# Patient Record
Sex: FEMALE | Race: WHITE | Employment: UNEMPLOYED | URBAN - METROPOLITAN AREA
[De-identification: names, ages, dates, MRNs, and addresses within clinical notes are randomized per-mention and may not be internally consistent; named-entity substitution may affect disease eponyms.]

---

## 2017-03-11 ENCOUNTER — HOSPITAL ENCOUNTER (OUTPATIENT)
Dept: WOMENS IMAGING | Age: 52
Discharge: HOME | End: 2017-03-11
Payer: COMMERCIAL

## 2017-03-11 DIAGNOSIS — Z78.0 POST-MENOPAUSAL: ICD-10-CM

## 2017-03-11 DIAGNOSIS — Z13.9 SCREENING: ICD-10-CM

## 2017-03-11 PROCEDURE — 77080 DXA BONE DENSITY AXIAL: CPT

## 2017-03-11 PROCEDURE — G0202 SCR MAMMO BI INCL CAD: HCPCS

## 2017-03-13 ENCOUNTER — APPOINTMENT (OUTPATIENT)
Dept: GENERAL RADIOLOGY | Age: 52
End: 2017-03-13
Payer: OTHER MISCELLANEOUS

## 2017-03-13 ENCOUNTER — HOSPITAL ENCOUNTER (EMERGENCY)
Age: 52
Discharge: HOME | End: 2017-03-13
Attending: EMERGENCY MEDICINE
Payer: OTHER MISCELLANEOUS

## 2017-03-13 VITALS
RESPIRATION RATE: 16 BRPM | HEART RATE: 71 BPM | OXYGEN SATURATION: 99 % | TEMPERATURE: 99 F | DIASTOLIC BLOOD PRESSURE: 81 MMHG | SYSTOLIC BLOOD PRESSURE: 159 MMHG

## 2017-03-13 DIAGNOSIS — M25.512 ACUTE PAIN OF LEFT SHOULDER: ICD-10-CM

## 2017-03-13 DIAGNOSIS — M25.562 LATERAL KNEE PAIN, LEFT: ICD-10-CM
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
## 2017-03-13 DIAGNOSIS — M54.2 NECK PAIN: ICD-10-CM

## 2017-03-13 PROCEDURE — 73562 X-RAY EXAM OF KNEE 3: CPT

## 2017-03-13 PROCEDURE — 73030 X-RAY EXAM OF SHOULDER: CPT

## 2017-03-13 PROCEDURE — 6370000000 HC RX 637 (ALT 250 FOR IP): Performed by: EMERGENCY MEDICINE

## 2017-03-13 PROCEDURE — 99284 EMERGENCY DEPT VISIT MOD MDM: CPT

## 2017-03-13 PROCEDURE — 71020 XR CHEST STANDARD TWO VW: CPT

## 2017-03-13 RX ORDER — CYCLOBENZAPRINE HCL 10 MG
10 TABLET ORAL 3 TIMES DAILY PRN
Qty: 15 TABLET | Refills: 0 | Status: SHIPPED | OUTPATIENT
Start: 2017-03-13 | End: 2017-03-23

## 2017-03-13 RX ORDER — IBUPROFEN 800 MG/1
800 TABLET ORAL EVERY 8 HOURS PRN
Qty: 30 TABLET | Refills: 0 | Status: SHIPPED | OUTPATIENT
Start: 2017-03-13 | End: 2019-07-23

## 2017-03-13 RX ORDER — IBUPROFEN 800 MG/1
800 TABLET ORAL ONCE
Status: COMPLETED | OUTPATIENT
Start: 2017-03-13 | End: 2017-03-13

## 2017-03-13 RX ADMIN — IBUPROFEN 800 MG: 800 TABLET, FILM COATED ORAL at 20:49

## 2017-03-13 ASSESSMENT — ENCOUNTER SYMPTOMS
BACK PAIN: 0
ABDOMINAL PAIN: 0
SORE THROAT: 0
NAUSEA: 0
COUGH: 0
VOMITING: 0
SHORTNESS OF BREATH: 0

## 2017-03-13 ASSESSMENT — PAIN DESCRIPTION - PAIN TYPE: TYPE: ACUTE PAIN

## 2017-03-13 ASSESSMENT — PAIN SCALES - GENERAL: PAINLEVEL_OUTOF10: 3

## 2017-03-13 ASSESSMENT — PAIN DESCRIPTION - ORIENTATION: ORIENTATION: LEFT

## 2017-03-21 ENCOUNTER — HOSPITAL ENCOUNTER (OUTPATIENT)
Age: 52
Discharge: HOME | End: 2017-03-21
Payer: OTHER MISCELLANEOUS

## 2017-03-21 ENCOUNTER — HOSPITAL ENCOUNTER (OUTPATIENT)
Dept: GENERAL RADIOLOGY | Age: 52
Discharge: HOME | End: 2017-03-21
Payer: OTHER MISCELLANEOUS

## 2017-03-21 DIAGNOSIS — R52 PAIN: ICD-10-CM

## 2017-03-21 LAB — TSH SERPL DL<=0.05 MIU/L-ACNC: 0.73 MIU/L (ref 0.3–5)

## 2017-03-21 PROCEDURE — 84443 ASSAY THYROID STIM HORMONE: CPT

## 2017-03-21 PROCEDURE — 73630 X-RAY EXAM OF FOOT: CPT

## 2017-03-21 PROCEDURE — 36415 COLL VENOUS BLD VENIPUNCTURE: CPT

## 2017-03-21 PROCEDURE — 73650 X-RAY EXAM OF HEEL: CPT

## 2019-07-17 ENCOUNTER — TELEPHONE (OUTPATIENT)
Dept: OBGYN CLINIC | Age: 54
End: 2019-07-17

## 2019-07-17 ENCOUNTER — OFFICE VISIT (OUTPATIENT)
Dept: OBGYN CLINIC | Age: 54
End: 2019-07-17

## 2019-07-17 ENCOUNTER — HOSPITAL ENCOUNTER (OUTPATIENT)
Age: 54
Setting detail: SPECIMEN
Discharge: HOME | End: 2019-07-17

## 2019-07-17 VITALS
HEART RATE: 50 BPM | HEIGHT: 65 IN | BODY MASS INDEX: 32.82 KG/M2 | WEIGHT: 197 LBS | SYSTOLIC BLOOD PRESSURE: 123 MMHG | DIASTOLIC BLOOD PRESSURE: 67 MMHG

## 2019-07-17 DIAGNOSIS — D21.9 FIBROID: ICD-10-CM

## 2019-07-17 DIAGNOSIS — Z01.419 WOMEN'S ANNUAL ROUTINE GYNECOLOGICAL EXAMINATION: ICD-10-CM

## 2019-07-17 DIAGNOSIS — Z12.39 SCREENING FOR BREAST CANCER: ICD-10-CM

## 2019-07-17 DIAGNOSIS — R10.2 PELVIC PAIN: ICD-10-CM

## 2019-07-17 DIAGNOSIS — Z01.419 WOMEN'S ANNUAL ROUTINE GYNECOLOGICAL EXAMINATION: Primary | ICD-10-CM

## 2019-07-17 LAB
T4 TOTAL: 7.5 UG/DL (ref 4.5–12)
TSH SERPL DL<=0.05 MIU/L-ACNC: 2.66 MIU/L (ref 0.3–5)

## 2019-07-17 PROCEDURE — 99386 PREV VISIT NEW AGE 40-64: CPT | Performed by: ADVANCED PRACTICE MIDWIFE

## 2019-07-17 ASSESSMENT — ENCOUNTER SYMPTOMS
CONSTIPATION: 1
RECTAL PAIN: 1
RESPIRATORY NEGATIVE: 1
BACK PAIN: 1
ABDOMINAL PAIN: 1

## 2019-07-17 NOTE — PROGRESS NOTES
T4   2. Screening for breast cancer  MONSERRAT DIGITAL SCREEN W CAD BILATERAL   3. Pelvic pain     4.  Fibroid

## 2019-07-17 NOTE — TELEPHONE ENCOUNTER
Called Sheldon Danielle and stated we need her to call us and reschedule her appt and it needs to be with Dr Elizabeth Bacon there is a need for surgery not saying the patient needs surgery but if it is warranted Dr Daniel Lombardo would be the surgeon

## 2019-07-19 LAB
HPV SAMPLE: NORMAL
HPV, GENOTYPE 16: NOT DETECTED
HPV, GENOTYPE 18: NOT DETECTED
HPV, HIGH RISK OTHER: NOT DETECTED
HPV, INTERPRETATION: NORMAL
SPECIMEN DESCRIPTION: NORMAL

## 2019-07-22 LAB — CYTOLOGY REPORT: NORMAL

## 2019-07-23 ENCOUNTER — OFFICE VISIT (OUTPATIENT)
Dept: FAMILY MEDICINE CLINIC | Age: 54
End: 2019-07-23
Payer: COMMERCIAL

## 2019-07-23 VITALS
WEIGHT: 191 LBS | DIASTOLIC BLOOD PRESSURE: 86 MMHG | TEMPERATURE: 97.8 F | HEART RATE: 58 BPM | BODY MASS INDEX: 31.82 KG/M2 | HEIGHT: 65 IN | OXYGEN SATURATION: 99 % | SYSTOLIC BLOOD PRESSURE: 137 MMHG

## 2019-07-23 DIAGNOSIS — E55.9 VITAMIN D DEFICIENCY: ICD-10-CM

## 2019-07-23 DIAGNOSIS — Z23 NEED FOR PROPHYLACTIC VACCINATION AND INOCULATION AGAINST VARICELLA: ICD-10-CM

## 2019-07-23 DIAGNOSIS — Z76.89 ENCOUNTER TO ESTABLISH CARE WITH NEW DOCTOR: Primary | ICD-10-CM

## 2019-07-23 DIAGNOSIS — Z23 NEED FOR PROPHYLACTIC VACCINATION AGAINST DIPHTHERIA-TETANUS-PERTUSSIS (DTP): ICD-10-CM

## 2019-07-23 DIAGNOSIS — F32.9 REACTIVE DEPRESSION: ICD-10-CM

## 2019-07-23 DIAGNOSIS — L72.3 INFLAMED EPIDERMOID CYST OF SKIN: ICD-10-CM

## 2019-07-23 DIAGNOSIS — Z12.31 ENCOUNTER FOR SCREENING MAMMOGRAM FOR BREAST CANCER: ICD-10-CM

## 2019-07-23 PROCEDURE — 99204 OFFICE O/P NEW MOD 45 MIN: CPT | Performed by: FAMILY MEDICINE

## 2019-07-23 PROCEDURE — G8417 CALC BMI ABV UP PARAM F/U: HCPCS | Performed by: FAMILY MEDICINE

## 2019-07-23 PROCEDURE — 90471 IMMUNIZATION ADMIN: CPT | Performed by: FAMILY MEDICINE

## 2019-07-23 PROCEDURE — G8427 DOCREV CUR MEDS BY ELIG CLIN: HCPCS | Performed by: FAMILY MEDICINE

## 2019-07-23 PROCEDURE — 90715 TDAP VACCINE 7 YRS/> IM: CPT | Performed by: FAMILY MEDICINE

## 2019-07-23 PROCEDURE — 1036F TOBACCO NON-USER: CPT | Performed by: FAMILY MEDICINE

## 2019-07-23 PROCEDURE — 3017F COLORECTAL CA SCREEN DOC REV: CPT | Performed by: FAMILY MEDICINE

## 2019-07-23 ASSESSMENT — PATIENT HEALTH QUESTIONNAIRE - PHQ9
1. LITTLE INTEREST OR PLEASURE IN DOING THINGS: 0
SUM OF ALL RESPONSES TO PHQ QUESTIONS 1-9: 0
SUM OF ALL RESPONSES TO PHQ QUESTIONS 1-9: 0
2. FEELING DOWN, DEPRESSED OR HOPELESS: 0
SUM OF ALL RESPONSES TO PHQ9 QUESTIONS 1 & 2: 0

## 2019-07-23 NOTE — PROGRESS NOTES
Allergen Reactions    Biaxin [Clarithromycin] Hives    Minocycline Hives    Prozac [Fluoxetine Hcl] Hives       Do you take any herbs or supplements that were not prescribed by a doctor? Gume Anton  Are you taking calcium supplements? not applicable  Are you taking aspirin daily? not applicable    Review of Systems  Do you have pain that bothers you in your daily life? no  Review of Systems   Constitutional: Negative for fever and unexpected weight change. HENT: Negative for ear pain, congestion, sore throat and rhinorrhea. Eyes: Negative for itching and visual disturbance. Respiratory: Negative for cough and shortness of breath. Cardiovascular: Negative for chest pain and leg swelling. Gastrointestinal: Negative for diarrhea, constipation and blood in stool. Positive for \"anus\"problems. Endocrine: Negative for polydipsia and polyuria. Genitourinary: Negative for dysuria and hematuria. Positive for vaginal discomfort? Musculoskeletal: Negative for back pain and gait problem. Positive for \"a hot scalp\". Skin: Negative for color change and rash. Neurological: Negative for dizziness and headaches. Psychiatric/Behavioral: Negative for confusion and agitation. Positive for depression. Objective:   HENT:   /86   Pulse 58   Temp 97.8 °F (36.6 °C) (Oral)   Ht 5' 5\" (1.651 m)   Wt 191 lb (86.6 kg)   LMP 10/30/2016 Comment: negative. SpO2 99%   BMI 31.78 kg/m²   Constitutional: Alert and oriented. Well-nourished. No distress. Head: Normocephalic and atraumatic. Right Ear: External ear normal. TM: no bulging, erythema or fluid seen. Left Ear: External ear normal. TM: no bulging, erythema or fluid seen. Nose: Nose normal.   Mouth/Throat: Oropharynx is clear and moist. teeth in good repair. Eyes: Pupils are equal, round, and reactive to light. Right eye exhibits no discharge. Left eye exhibits no discharge. No scleral icterus. Neck: Normal range of motion. Neck supple. thyroid function done just couple weeks ago which were of normal limits. Patient is only been using iodine over-the-counter. No other medications. She will continue and she will have her blood work done again in 4 to 6 weeks. After blood work she will see me back for follow-up. Patient will see counselor for her reactive depression. I also will order vitamin D as she has a history of vitamin D deficiency. Patient already has an appointment with gynecology. Vaccination provided. Patient Counseling:  --Nutrition: Stressed importance of moderation in sodium/caffeine intake, saturated fat and cholesterol, caloric balance, sufficient intake of fresh fruits, vegetables, fiber, calcium, iron, and 1 mg of folate supplement per day (for females capable of pregnancy). --Exercise: Stressed the importance of regular exercise. --Substance Abuse: Discussed cessation/primary prevention of tobacco, alcohol, or other drug use; driving or other dangerous activities under the influence; availability of treatment for abuse. --Sexuality: Discussed sexually transmitted diseases, partner selection, use of condoms, avoidance of unintended pregnancy  and contraceptive alternatives. --Injury prevention: Discussed safety belts, safety helmets, smoke detector, smoking near bedding or upholstery. --Dental health: Discussed importance of regular tooth brushing, flossing, and dental visits. --Immunizations reviewed. --After hours service discussed with patient    Charleen Lentz received counseling on the following healthy behaviors: nutrition, exercise and medication adherence  Reviewed prior labs and health maintenance  Continue current medications, diet and exercise. Discussed use, benefit, and side effects of prescribed medications. Barriers to medication compliance addressed. Patient given educational materials - see patient instructions  Was a self-tracking handout given in paper form or via [employer]?  No: .    Requested Prescriptions     Signed Prescriptions Disp Refills    zoster recombinant adjuvanted vaccine (SHINGRIX) 50 MCG/0.5ML SUSR injection 1 each 1     Sig: Inject 0.5 mLs into the muscle once for 1 dose 50 MCG IM then repeat 2-6 months. All patient questions answered. Patient voiced understanding. Quality Measures    Body mass index is 31.78 kg/m². Elevated. Weight control planned discussed daily exercise regimen and Healthy diet and regular exercise. BP: 137/86 Blood pressure is normal. Treatment plan consists of No treatment change needed. No results found for: LDLCALC, LDLCHOLESTEROL, LDLDIRECT (goal LDL reduction with dx if diabetes is 50% LDL reduction)      PHQ Scores 7/23/2019   PHQ2 Score 0   PHQ9 Score 0     Interpretation of Total Score Depression Severity: 1-4 = Minimal depression, 5-9 = Mild depression, 10-14 = Moderate depression, 15-19 = Moderately severe depression, 20-27 = Severe depression     Follow up in 6 weeks      Call or return to clinic prn if these symptoms worsen or fail to improve as anticipated. I have reviewed the instructions with the patient, answering all questions to her satisfaction.       (Please note that portions of this note were completed with a voice recognition program. Efforts were made to edit the dictations but occasionally words are mis-transcribed.)

## 2019-07-24 ENCOUNTER — HOSPITAL ENCOUNTER (OUTPATIENT)
Dept: MAMMOGRAPHY | Age: 54
Discharge: HOME | End: 2019-07-26

## 2019-07-24 DIAGNOSIS — Z12.31 ENCOUNTER FOR SCREENING MAMMOGRAM FOR BREAST CANCER: ICD-10-CM

## 2019-07-24 PROCEDURE — 77067 SCR MAMMO BI INCL CAD: CPT

## 2019-08-21 ENCOUNTER — TELEPHONE (OUTPATIENT)
Dept: ONCOLOGY | Age: 54
End: 2019-08-21

## 2019-09-12 ENCOUNTER — OFFICE VISIT (OUTPATIENT)
Dept: OBGYN | Age: 54
End: 2019-09-12

## 2019-09-12 VITALS
HEIGHT: 65 IN | WEIGHT: 194.4 LBS | HEART RATE: 64 BPM | DIASTOLIC BLOOD PRESSURE: 71 MMHG | SYSTOLIC BLOOD PRESSURE: 132 MMHG | BODY MASS INDEX: 32.39 KG/M2

## 2019-09-12 DIAGNOSIS — K62.89 RECTAL PAIN: ICD-10-CM

## 2019-09-12 DIAGNOSIS — R19.8 ABDOMINAL FULLNESS IN LEFT LOWER QUADRANT: Primary | ICD-10-CM

## 2019-09-12 PROBLEM — Z98.890 H/O LEEP: Status: ACTIVE | Noted: 2019-09-12

## 2019-09-12 PROCEDURE — 99204 OFFICE O/P NEW MOD 45 MIN: CPT | Performed by: STUDENT IN AN ORGANIZED HEALTH CARE EDUCATION/TRAINING PROGRAM

## 2019-09-12 NOTE — PROGRESS NOTES
visit. ALLERGIES:  Allergies as of 09/12/2019 - Review Complete 09/12/2019   Allergen Reaction Noted    Biaxin [clarithromycin] Hives 06/06/2016    Minocycline Hives 11/12/2015    Prozac [fluoxetine hcl] Hives 11/12/2015                                   VITALS:  Vitals:    09/12/19 1558   BP: 132/71   Pulse: 64   Weight: 194 lb 6.4 oz (88.2 kg)   Height: 5' 5\" (1.651 m)                                                                                                                                                                         PHYSICAL EXAM:     General Appearance: Appears healthy. Alert; in no acute distress. Pleasant. Respiratory: clear to auscultation, no wheezes, rales or rhonchi, symmetric air entry  Cardiovascular: regular rate and rhythm, no murmurs, rubs, or gallops  Breast:  Defer to annual exam  Abdomen: soft, non-tender, non-distended, no right upper quadrant tenderness and no CVA tenderness  Pelvic Exam:   External genitalia: General appearance; normal, Hair distribution; normal, Lesions absent  Urinary system: urethral meatus normal, smooth nontender bladder   Vaginal: postmenopausal changes without rugae, grade I cystocele present, enterocele present  Cervix: cervix without discharge or lesions that is flush with vagina  Adnexa: normal adnexa in size, nontender and no masses  Uterus: normal single, nontender and retroverted, no large masses appreciated   Rectal Exam: exam attempted by attending physician and patient could not tolerate, anal sphincter was extremely tight  Extremities: non-tender BLE and non-edematous  Musculoskeletal: no gross abnormalities  Psych: Normal. and Alert and oriented, appropriate affect. OMM EXAM:  The patient did not complain of a Chief complaint requiring OMM.   Chief Complaint:LLQ fullness and anal pain    Structural Exam: No Interest    ASSESSMENT & PLAN:    Jess Garces is a 47 y.o. female [id number] with LLQ fullness and anal pain who inquires

## 2020-02-10 ENCOUNTER — OFFICE VISIT (OUTPATIENT)
Dept: FAMILY MEDICINE CLINIC | Age: 55
End: 2020-02-10
Payer: COMMERCIAL

## 2020-02-10 ENCOUNTER — TELEPHONE (OUTPATIENT)
Dept: FAMILY MEDICINE CLINIC | Age: 55
End: 2020-02-10

## 2020-02-10 VITALS
OXYGEN SATURATION: 98 % | WEIGHT: 193.4 LBS | DIASTOLIC BLOOD PRESSURE: 75 MMHG | SYSTOLIC BLOOD PRESSURE: 126 MMHG | TEMPERATURE: 96.9 F | HEART RATE: 76 BPM | BODY MASS INDEX: 32.18 KG/M2

## 2020-02-10 LAB — S PYO AG THROAT QL: NORMAL

## 2020-02-10 PROCEDURE — G8417 CALC BMI ABV UP PARAM F/U: HCPCS | Performed by: FAMILY MEDICINE

## 2020-02-10 PROCEDURE — 3017F COLORECTAL CA SCREEN DOC REV: CPT | Performed by: FAMILY MEDICINE

## 2020-02-10 PROCEDURE — 99214 OFFICE O/P EST MOD 30 MIN: CPT | Performed by: FAMILY MEDICINE

## 2020-02-10 PROCEDURE — G8484 FLU IMMUNIZE NO ADMIN: HCPCS | Performed by: FAMILY MEDICINE

## 2020-02-10 PROCEDURE — 1036F TOBACCO NON-USER: CPT | Performed by: FAMILY MEDICINE

## 2020-02-10 PROCEDURE — 87880 STREP A ASSAY W/OPTIC: CPT | Performed by: FAMILY MEDICINE

## 2020-02-10 PROCEDURE — G8427 DOCREV CUR MEDS BY ELIG CLIN: HCPCS | Performed by: FAMILY MEDICINE

## 2020-02-10 RX ORDER — AZITHROMYCIN 250 MG/1
TABLET, FILM COATED ORAL
Qty: 6 TABLET | Refills: 0 | Status: SHIPPED | OUTPATIENT
Start: 2020-02-10 | End: 2020-02-20

## 2020-02-10 ASSESSMENT — PATIENT HEALTH QUESTIONNAIRE - PHQ9
1. LITTLE INTEREST OR PLEASURE IN DOING THINGS: 0
SUM OF ALL RESPONSES TO PHQ9 QUESTIONS 1 & 2: 1
SUM OF ALL RESPONSES TO PHQ QUESTIONS 1-9: 1
2. FEELING DOWN, DEPRESSED OR HOPELESS: 1
SUM OF ALL RESPONSES TO PHQ QUESTIONS 1-9: 1

## 2020-02-10 NOTE — PROGRESS NOTES
General FM note    Alex Leblanc is a 49 y.o. female who presents today for follow up on her  medical conditions as noted below.   Alex Leblanc is c/o of   Chief Complaint   Patient presents with    Pharyngitis    Back Pain       Patient Active Problem List:     Cyst of skin and subcutaneous tissue     Neoplasm of uncertain behavior of skin     Abdominal pain     Cystocele     Rectocele     Inflamed epidermoid cyst of skin     Fibroid     H/O LEEP     Past Medical History:   Diagnosis Date    Abnormal Pap smear of cervix     Anxiety     Arthritis     NECK    Dental crowns present     UPPER ;AND LOWER    Depression     Difficult intravenous access     AVIOID RT HAND    Dizziness     R/T H-PYLORI    Dysphasia     states has cleared up    Ganglion cyst     LT WRIST. MID BACK    H pylori ulcer 2016    ABD-TREATED WITH ATB FEELING BETTER    HPV in female     Hypothyroidism     Nausea     R/T H PYLORI    Osteoarthritis     Rectal prolapse     SAB (spontaneous )     Shortness of breath     AT TIMES    Wears glasses     Wears glasses       Past Surgical History:   Procedure Laterality Date    APPENDECTOMY      COLONOSCOPY  2016    PT STATES NORMAL    CYST REMOVAL      LT WRIST GANGLION CYSTECTOMY    OTHER SURGICAL HISTORY  2015    EXCISION LESIONS MID BACK X2    OTHER SURGICAL HISTORY      pre cancer cells removed from cervix    OTHER SURGICAL HISTORY  10/31/2016    EXCISION SCALP LEISION X3    TUBAL LIGATION      UPPER GASTROINTESTINAL ENDOSCOPY  2016    with biopsy    VAGINA SURGERY  16    vag A/P repair with cysto     Family History   Problem Relation Age of Onset    Diabetes Mother     Hypertension Mother     Cancer Mother         KIDNEY    Other Mother         MENTAL HEALTH DISORDER    Arthritis Father         HIP REPLACEMENT    Other Sister         MENTAL DISORDER    Other Sister         FIBROMYALGIA, MENTAL HEALTH ISSUES    Arthritis Sister      Current Outpatient Medications   Medication Sig Dispense Refill    azithromycin (ZITHROMAX) 250 MG tablet 2 tablets by mouth day one, then 1 tablet daily by mouth for 4 more days 6 tablet 0    IODINE EX       Turmeric 450 MG CAPS Take by mouth       No current facility-administered medications for this visit. ALLERGIES:    Allergies   Allergen Reactions    Biaxin [Clarithromycin] Hives    Minocycline Hives    Prozac [Fluoxetine Hcl] Hives       Social History     Tobacco Use    Smoking status: Never Smoker    Smokeless tobacco: Never Used   Substance Use Topics    Alcohol use: Yes     Alcohol/week: 1.0 standard drinks     Types: 1 Standard drinks or equivalent per week     Comment: BEER, WINE OR MIXED DRINKS 4 DRINKS A MONTH      Body mass index is 32.18 kg/m². /75   Pulse 76   Temp 96.9 °F (36.1 °C) (Oral)   Wt 193 lb 6.4 oz (87.7 kg)   LMP 10/30/2016 Comment: negative. SpO2 98%   BMI 32.18 kg/m²     Subjective:      HPI    48 yo female coming in with multiple complaints today. Feels run down sore throat. For last 3 days. Smelly breath - not really nasal drainage. Post nasal drip. L mid back pains like when having a walking pneumonia. SOB   On and off. Headaches. Whole head - resolved. But the patient states that she has been having pressure especially at her left temple area. Pressure to her head and she feels that this could be related to a fall 5 years ago and she just has to know what is going on. Cough more so at the end of the day. Dry cough. Fever ? ? No S or C. Ask about thyroid -but then drifts off to another problem. She feels that her left neck is very tight that she cannot get focused and then again she feels that depression has not is the reason for it. \"She also complains about the left tenderness I need to know\". At her lateral mid back. There is just one tender point she has been having now for month a month.     Review of Systems

## 2020-02-10 NOTE — PATIENT INSTRUCTIONS
lightheadedness. ? A fast or uneven pulse. After calling  911, chew 1 adult-strength aspirin. Wait for an ambulance. Do not try to drive yourself.     · You have severe trouble breathing.    Call your doctor now or seek immediate medical care if:    · You have a fever or cough.     · You have any trouble breathing.     · Your chest pain gets worse.    Watch closely for changes in your health, and be sure to contact your doctor if:    · Your chest pain continues even though you are taking anti-inflammatory medicine.     · Your chest wall pain has not improved after 5 to 7 days. Where can you learn more? Go to https://[employer]Rank[employer] org and sign in to your [employer] account. Enter Y317 in the Max-Viz box to learn more about \"Costochondritis: Care Instructions. \"     If you do not have an account, please click on the \"Sign Up Now\" link. Current as of: June 26, 2019  Content Version: 12.3  © 4684-8691 [employer]. Care instructions adapted under license by [facility] [address] If you have questions about a medical condition or this instruction, always ask your healthcare professional. [place] [address] any warranty or liability for your use of this information.

## 2020-02-13 ENCOUNTER — TELEPHONE (OUTPATIENT)
Dept: FAMILY MEDICINE CLINIC | Age: 55
End: 2020-02-13

## 2020-02-13 NOTE — TELEPHONE ENCOUNTER
Pt states her Renal US is not covered by insurance. I spoke with pt insurance who requested pts US renal CPT code and stated pt would have a $30 copay and mumbled something else and stated he had what he needed and hung up.

## 2020-03-05 ENCOUNTER — TELEPHONE (OUTPATIENT)
Dept: FAMILY MEDICINE CLINIC | Age: 55
End: 2020-03-05

## 2020-03-17 ENCOUNTER — HOSPITAL ENCOUNTER (OUTPATIENT)
Dept: ULTRASOUND IMAGING | Age: 55
Discharge: HOME | End: 2020-03-19
Payer: COMMERCIAL

## 2020-03-17 ENCOUNTER — HOSPITAL ENCOUNTER (OUTPATIENT)
Dept: CT IMAGING | Age: 55
Discharge: HOME | End: 2020-03-19
Payer: COMMERCIAL

## 2020-03-17 PROCEDURE — 70450 CT HEAD/BRAIN W/O DYE: CPT

## 2020-03-17 PROCEDURE — 76770 US EXAM ABDO BACK WALL COMP: CPT

## 2020-03-19 ENCOUNTER — TELEPHONE (OUTPATIENT)
Dept: FAMILY MEDICINE CLINIC | Age: 55
End: 2020-03-19

## 2020-03-24 ENCOUNTER — TELEPHONE (OUTPATIENT)
Dept: FAMILY MEDICINE CLINIC | Age: 55
End: 2020-03-24

## 2020-03-25 NOTE — TELEPHONE ENCOUNTER
Yes. If she want to see a special PT please refer her. I will work on Monday on changing the diagnosis on her chart. Thank you.

## 2020-04-30 RX ORDER — M-VIT,TX,IRON,MINS/CALC/FOLIC 27MG-0.4MG
1 TABLET ORAL DAILY
COMMUNITY
End: 2020-09-14

## 2020-05-01 ENCOUNTER — HOSPITAL ENCOUNTER (OUTPATIENT)
Age: 55
Discharge: HOME | End: 2020-05-01
Payer: COMMERCIAL

## 2020-05-01 LAB
ABSOLUTE EOS #: 0.1 K/UL (ref 0–0.44)
ABSOLUTE IMMATURE GRANULOCYTE: <0.03 K/UL (ref 0–0.3)
ABSOLUTE LYMPH #: 2.99 K/UL (ref 1.1–3.7)
ABSOLUTE MONO #: 0.5 K/UL (ref 0.1–1.2)
BASOPHILS # BLD: 1 % (ref 0–2)
BASOPHILS ABSOLUTE: 0.05 K/UL (ref 0–0.2)
DIFFERENTIAL TYPE: NORMAL
EOSINOPHILS RELATIVE PERCENT: 1 % (ref 1–4)
HCT VFR BLD CALC: 41.3 % (ref 36.3–47.1)
HEMOGLOBIN: 13 G/DL (ref 11.9–15.1)
IMMATURE GRANULOCYTES: 0 %
LYMPHOCYTES # BLD: 33 % (ref 24–43)
MCH RBC QN AUTO: 27.8 PG (ref 25.2–33.5)
MCHC RBC AUTO-ENTMCNC: 31.5 G/DL (ref 28.4–34.8)
MCV RBC AUTO: 88.4 FL (ref 82.6–102.9)
MONOCYTES # BLD: 5 % (ref 3–12)
NRBC AUTOMATED: 0 PER 100 WBC
PDW BLD-RTO: 13.8 % (ref 11.8–14.4)
PLATELET # BLD: 229 K/UL (ref 138–453)
PLATELET ESTIMATE: NORMAL
PMV BLD AUTO: 11.3 FL (ref 8.1–13.5)
RBC # BLD: 4.67 M/UL (ref 3.95–5.11)
RBC # BLD: NORMAL 10*6/UL
SEG NEUTROPHILS: 60 % (ref 36–65)
SEGMENTED NEUTROPHILS ABSOLUTE COUNT: 5.55 K/UL (ref 1.5–8.1)
WBC # BLD: 9.2 K/UL (ref 3.5–11.3)
WBC # BLD: NORMAL 10*3/UL

## 2020-05-01 PROCEDURE — 85025 COMPLETE CBC W/AUTO DIFF WBC: CPT

## 2020-05-01 PROCEDURE — 36415 COLL VENOUS BLD VENIPUNCTURE: CPT

## 2020-05-03 ENCOUNTER — HOSPITAL ENCOUNTER (OUTPATIENT)
Dept: PREADMISSION TESTING | Age: 55
Discharge: HOME | End: 2020-05-07
Payer: COMMERCIAL

## 2020-05-03 LAB
SARS-COV-2, PCR: NORMAL
SARS-COV-2, RAPID: NORMAL
SARS-COV-2: NOT DETECTED
SOURCE: NORMAL

## 2020-05-03 PROCEDURE — U0002 COVID-19 LAB TEST NON-CDC: HCPCS

## 2020-05-04 ENCOUNTER — ANESTHESIA EVENT (OUTPATIENT)
Dept: OPERATING ROOM | Age: 55
End: 2020-05-04
Payer: COMMERCIAL

## 2020-05-04 ENCOUNTER — TELEPHONE (OUTPATIENT)
Dept: PRIMARY CARE CLINIC | Age: 55
End: 2020-05-04

## 2020-05-04 NOTE — H&P
Minocycline Hives 11/12/2015    Prozac [fluoxetine hcl] Hives 11/12/2015       MEDICATIONS:  Current Facility-Administered Medications   Medication Dose Route Frequency Provider Last Rate Last Dose    0.9 % sodium chloride infusion   Intravenous Continuous Rhonda Ramirez MD        lactated ringers infusion   Intravenous Continuous Rhonda Ramirez MD        sodium chloride flush 0.9 % injection 10 mL  10 mL Intravenous 2 times per day Rhonda Ramirez MD        sodium chloride flush 0.9 % injection 10 mL  10 mL Intravenous PRN Rhonda Ramirez MD        midazolam (VERSED) injection 2 mg  2 mg Intravenous Once PRN Rhonda Ramirez MD        triamcinolone acetonide (KENALOG-40) 40 MG/ML injection             bupivacaine liposome (EXPAREL) 1.3 % injection                FAMILY HISTORY:  family history includes Arthritis in her father and sister; Garfield Floras in her mother; Diabetes in her mother; Hypertension in her mother; Other in her mother, sister, and sister. SOCIAL HISTORY:   reports that she has never smoked. She has never used smokeless tobacco. She reports current alcohol use of about 1.0 standard drinks of alcohol per week. She reports current drug use. Drug: Marijuana.     VITALS:  Vitals:    04/30/20 1320 05/05/20 0819   BP:  (!) 157/77   Pulse:  62   Resp:  12   Temp:  97.8 °F (36.6 °C)   TempSrc:  Temporal   SpO2:  98%   Weight: 190 lb (86.2 kg) 190 lb (86.2 kg)   Height: 5' 5\" (1.651 m) 5' 5\" (1.651 m)                                                                                                                               PHYSICAL EXAM:     General: appears healthy and stated age  Heart: RRR, no murmurs  Lungs: CTAB without wheezes or crackles  Abdomen: soft, non-tender, non-distended, no guarding, rebound or rigidity, no CVA tenderness   : defer to OR  Extremity: non-tender, non-edematous     LAB RESULTS:  Hospital Outpatient Visit on 05/03/2020   Component Date Value Ref Range Status    SARS-CoV-2 05/03/2020 Not Detected  Not Detected Final    Comment:       The specimen is NEGATIVE for SARS-CoV-2, the novel coronavirus associated with COVID-19. A negative result does not rule out COVID-19. This test has been authorized by the FDA under an Emergency Use Authorization (EUA) for use   by authorized laboratories. Fact sheet for Healthcare Providers: kstattoo.com  Fact sheet for Patients: kstattoo.com        METHODOLOGY: RT-PCR      SARS-CoV-2, Rapid 05/03/2020        Final    Source 05/03/2020 . NASOPHARYNGEAL SWAB   Final    SARS-CoV-2, PCR 05/03/2020        Final   Hospital Outpatient Visit on 05/01/2020   Component Date Value Ref Range Status    WBC 05/01/2020 9.2  3.5 - 11.3 k/uL Final    RBC 05/01/2020 4.67  3.95 - 5.11 m/uL Final    Hemoglobin 05/01/2020 13.0  11.9 - 15.1 g/dL Final    Hematocrit 05/01/2020 41.3  36.3 - 47.1 % Final    MCV 05/01/2020 88.4  82.6 - 102.9 fL Final    MCH 05/01/2020 27.8  25.2 - 33.5 pg Final    MCHC 05/01/2020 31.5  28.4 - 34.8 g/dL Final    RDW 05/01/2020 13.8  11.8 - 14.4 % Final    Platelets 00/91/4584 229  138 - 453 k/uL Final    MPV 05/01/2020 11.3  8.1 - 13.5 fL Final    NRBC Automated 05/01/2020 0.0  0.0 per 100 WBC Final    Differential Type 05/01/2020 NOT REPORTED   Final    Seg Neutrophils 05/01/2020 60  36 - 65 % Final    Lymphocytes 05/01/2020 33  24 - 43 % Final    Monocytes 05/01/2020 5  3 - 12 % Final    Eosinophils % 05/01/2020 1  1 - 4 % Final    Basophils 05/01/2020 1  0 - 2 % Final    Immature Granulocytes 05/01/2020 0  0 % Final    Segs Absolute 05/01/2020 5.55  1.50 - 8.10 k/uL Final    Absolute Lymph # 05/01/2020 2.99  1.10 - 3.70 k/uL Final    Absolute Mono # 05/01/2020 0.50  0.10 - 1.20 k/uL Final    Absolute Eos # 05/01/2020 0.10  0.00 - 0.44 k/uL Final    Basophils Absolute 05/01/2020 0.05  0.00 - 0.20 k/uL Final    Absolute Immature Granulocyte 05/01/2020 <0.03  0.00 - 0.30 k/uL

## 2020-05-05 ENCOUNTER — HOSPITAL ENCOUNTER (OUTPATIENT)
Age: 55
Setting detail: OUTPATIENT SURGERY
Discharge: HOME | End: 2020-05-05
Attending: OBSTETRICS & GYNECOLOGY | Admitting: OBSTETRICS & GYNECOLOGY
Payer: COMMERCIAL

## 2020-05-05 ENCOUNTER — ANESTHESIA (OUTPATIENT)
Dept: OPERATING ROOM | Age: 55
End: 2020-05-05
Payer: COMMERCIAL

## 2020-05-05 VITALS — OXYGEN SATURATION: 100 % | DIASTOLIC BLOOD PRESSURE: 53 MMHG | SYSTOLIC BLOOD PRESSURE: 99 MMHG

## 2020-05-05 VITALS
WEIGHT: 190 LBS | HEART RATE: 49 BPM | TEMPERATURE: 96.6 F | HEIGHT: 65 IN | OXYGEN SATURATION: 97 % | DIASTOLIC BLOOD PRESSURE: 57 MMHG | SYSTOLIC BLOOD PRESSURE: 111 MMHG | BODY MASS INDEX: 31.65 KG/M2 | RESPIRATION RATE: 19 BRPM

## 2020-05-05 PROBLEM — Z98.890 POST-OPERATIVE STATE: Status: ACTIVE | Noted: 2020-05-05

## 2020-05-05 LAB — HCG, PREGNANCY URINE (POC): NEGATIVE

## 2020-05-05 PROCEDURE — 3600000002 HC SURGERY LEVEL 2 BASE: Performed by: OBSTETRICS & GYNECOLOGY

## 2020-05-05 PROCEDURE — 6360000002 HC RX W HCPCS: Performed by: NURSE ANESTHETIST, CERTIFIED REGISTERED

## 2020-05-05 PROCEDURE — 81025 URINE PREGNANCY TEST: CPT

## 2020-05-05 PROCEDURE — 2580000003 HC RX 258: Performed by: ANESTHESIOLOGY

## 2020-05-05 PROCEDURE — C9290 INJ, BUPIVACAINE LIPOSOME: HCPCS | Performed by: OBSTETRICS & GYNECOLOGY

## 2020-05-05 PROCEDURE — 6360000002 HC RX W HCPCS: Performed by: OBSTETRICS & GYNECOLOGY

## 2020-05-05 PROCEDURE — 7100000000 HC PACU RECOVERY - FIRST 15 MIN: Performed by: OBSTETRICS & GYNECOLOGY

## 2020-05-05 PROCEDURE — 7100000011 HC PHASE II RECOVERY - ADDTL 15 MIN: Performed by: OBSTETRICS & GYNECOLOGY

## 2020-05-05 PROCEDURE — 7100000010 HC PHASE II RECOVERY - FIRST 15 MIN: Performed by: OBSTETRICS & GYNECOLOGY

## 2020-05-05 PROCEDURE — 3600000012 HC SURGERY LEVEL 2 ADDTL 15MIN: Performed by: OBSTETRICS & GYNECOLOGY

## 2020-05-05 PROCEDURE — 2500000003 HC RX 250 WO HCPCS: Performed by: NURSE ANESTHETIST, CERTIFIED REGISTERED

## 2020-05-05 PROCEDURE — 3700000001 HC ADD 15 MINUTES (ANESTHESIA): Performed by: OBSTETRICS & GYNECOLOGY

## 2020-05-05 PROCEDURE — 2709999900 HC NON-CHARGEABLE SUPPLY: Performed by: OBSTETRICS & GYNECOLOGY

## 2020-05-05 PROCEDURE — 3700000000 HC ANESTHESIA ATTENDED CARE: Performed by: OBSTETRICS & GYNECOLOGY

## 2020-05-05 PROCEDURE — 2580000003 HC RX 258: Performed by: OBSTETRICS & GYNECOLOGY

## 2020-05-05 RX ORDER — FENTANYL CITRATE 50 UG/ML
INJECTION, SOLUTION INTRAMUSCULAR; INTRAVENOUS PRN
Status: DISCONTINUED | OUTPATIENT
Start: 2020-05-05 | End: 2020-05-05 | Stop reason: SDUPTHER

## 2020-05-05 RX ORDER — MIDAZOLAM HYDROCHLORIDE 1 MG/ML
2 INJECTION INTRAMUSCULAR; INTRAVENOUS
Status: DISCONTINUED | OUTPATIENT
Start: 2020-05-05 | End: 2020-05-05 | Stop reason: HOSPADM

## 2020-05-05 RX ORDER — PROMETHAZINE HYDROCHLORIDE 25 MG/ML
6.25 INJECTION, SOLUTION INTRAMUSCULAR; INTRAVENOUS
Status: DISCONTINUED | OUTPATIENT
Start: 2020-05-05 | End: 2020-05-05 | Stop reason: HOSPADM

## 2020-05-05 RX ORDER — HYDRALAZINE HYDROCHLORIDE 20 MG/ML
5 INJECTION INTRAMUSCULAR; INTRAVENOUS EVERY 10 MIN PRN
Status: DISCONTINUED | OUTPATIENT
Start: 2020-05-05 | End: 2020-05-05 | Stop reason: HOSPADM

## 2020-05-05 RX ORDER — ONDANSETRON 2 MG/ML
4 INJECTION INTRAMUSCULAR; INTRAVENOUS
Status: DISCONTINUED | OUTPATIENT
Start: 2020-05-05 | End: 2020-05-05 | Stop reason: HOSPADM

## 2020-05-05 RX ORDER — TRIAMCINOLONE ACETONIDE 40 MG/ML
INJECTION, SUSPENSION INTRA-ARTICULAR; INTRAMUSCULAR
Status: DISCONTINUED
Start: 2020-05-05 | End: 2020-05-05 | Stop reason: HOSPADM

## 2020-05-05 RX ORDER — SODIUM CHLORIDE 0.9 % (FLUSH) 0.9 %
10 SYRINGE (ML) INJECTION PRN
Status: DISCONTINUED | OUTPATIENT
Start: 2020-05-05 | End: 2020-05-05 | Stop reason: HOSPADM

## 2020-05-05 RX ORDER — TRIAMCINOLONE ACETONIDE 40 MG/ML
INJECTION, SUSPENSION INTRA-ARTICULAR; INTRAMUSCULAR PRN
Status: DISCONTINUED | OUTPATIENT
Start: 2020-05-05 | End: 2020-05-05 | Stop reason: ALTCHOICE

## 2020-05-05 RX ORDER — MIDAZOLAM HYDROCHLORIDE 1 MG/ML
INJECTION INTRAMUSCULAR; INTRAVENOUS PRN
Status: DISCONTINUED | OUTPATIENT
Start: 2020-05-05 | End: 2020-05-05 | Stop reason: SDUPTHER

## 2020-05-05 RX ORDER — SODIUM CHLORIDE 9 MG/ML
INJECTION, SOLUTION INTRAVENOUS CONTINUOUS
Status: DISCONTINUED | OUTPATIENT
Start: 2020-05-05 | End: 2020-05-05 | Stop reason: HOSPADM

## 2020-05-05 RX ORDER — IBUPROFEN 600 MG/1
600 TABLET ORAL EVERY 6 HOURS PRN
Qty: 30 TABLET | Refills: 1 | Status: SHIPPED | OUTPATIENT
Start: 2020-05-05 | End: 2020-09-14

## 2020-05-05 RX ORDER — PROPOFOL 10 MG/ML
INJECTION, EMULSION INTRAVENOUS PRN
Status: DISCONTINUED | OUTPATIENT
Start: 2020-05-05 | End: 2020-05-05 | Stop reason: SDUPTHER

## 2020-05-05 RX ORDER — FENTANYL CITRATE 50 UG/ML
25 INJECTION, SOLUTION INTRAMUSCULAR; INTRAVENOUS EVERY 5 MIN PRN
Status: DISCONTINUED | OUTPATIENT
Start: 2020-05-05 | End: 2020-05-05 | Stop reason: HOSPADM

## 2020-05-05 RX ORDER — MORPHINE SULFATE 1 MG/ML
1 INJECTION, SOLUTION EPIDURAL; INTRATHECAL; INTRAVENOUS EVERY 5 MIN PRN
Status: DISCONTINUED | OUTPATIENT
Start: 2020-05-05 | End: 2020-05-05 | Stop reason: HOSPADM

## 2020-05-05 RX ORDER — LIDOCAINE HYDROCHLORIDE 10 MG/ML
INJECTION, SOLUTION EPIDURAL; INFILTRATION; INTRACAUDAL; PERINEURAL PRN
Status: DISCONTINUED | OUTPATIENT
Start: 2020-05-05 | End: 2020-05-05 | Stop reason: SDUPTHER

## 2020-05-05 RX ORDER — DIPHENHYDRAMINE HYDROCHLORIDE 50 MG/ML
12.5 INJECTION INTRAMUSCULAR; INTRAVENOUS
Status: DISCONTINUED | OUTPATIENT
Start: 2020-05-05 | End: 2020-05-05 | Stop reason: HOSPADM

## 2020-05-05 RX ORDER — PROPOFOL 10 MG/ML
INJECTION, EMULSION INTRAVENOUS CONTINUOUS PRN
Status: DISCONTINUED | OUTPATIENT
Start: 2020-05-05 | End: 2020-05-05 | Stop reason: SDUPTHER

## 2020-05-05 RX ORDER — SODIUM CHLORIDE 0.9 % (FLUSH) 0.9 %
10 SYRINGE (ML) INJECTION EVERY 12 HOURS SCHEDULED
Status: DISCONTINUED | OUTPATIENT
Start: 2020-05-05 | End: 2020-05-05 | Stop reason: HOSPADM

## 2020-05-05 RX ORDER — MAGNESIUM HYDROXIDE 1200 MG/15ML
LIQUID ORAL CONTINUOUS PRN
Status: COMPLETED | OUTPATIENT
Start: 2020-05-05 | End: 2020-05-05

## 2020-05-05 RX ORDER — HYDROCODONE BITARTRATE AND ACETAMINOPHEN 5; 325 MG/1; MG/1
2 TABLET ORAL PRN
Status: DISCONTINUED | OUTPATIENT
Start: 2020-05-05 | End: 2020-05-05 | Stop reason: HOSPADM

## 2020-05-05 RX ORDER — HYDROCODONE BITARTRATE AND ACETAMINOPHEN 5; 325 MG/1; MG/1
1 TABLET ORAL PRN
Status: DISCONTINUED | OUTPATIENT
Start: 2020-05-05 | End: 2020-05-05 | Stop reason: HOSPADM

## 2020-05-05 RX ORDER — SODIUM CHLORIDE, SODIUM LACTATE, POTASSIUM CHLORIDE, CALCIUM CHLORIDE 600; 310; 30; 20 MG/100ML; MG/100ML; MG/100ML; MG/100ML
INJECTION, SOLUTION INTRAVENOUS CONTINUOUS
Status: DISCONTINUED | OUTPATIENT
Start: 2020-05-05 | End: 2020-05-05 | Stop reason: HOSPADM

## 2020-05-05 RX ORDER — SODIUM CHLORIDE 9 MG/ML
INJECTION INTRAVENOUS PRN
Status: DISCONTINUED | OUTPATIENT
Start: 2020-05-05 | End: 2020-05-05 | Stop reason: ALTCHOICE

## 2020-05-05 RX ADMIN — FENTANYL CITRATE 50 MCG: 50 INJECTION INTRAMUSCULAR; INTRAVENOUS at 08:56

## 2020-05-05 RX ADMIN — LIDOCAINE HYDROCHLORIDE 50 MG: 10 INJECTION, SOLUTION EPIDURAL; INFILTRATION; INTRACAUDAL; PERINEURAL at 08:41

## 2020-05-05 RX ADMIN — MIDAZOLAM HYDROCHLORIDE 2 MG: 1 INJECTION, SOLUTION INTRAMUSCULAR; INTRAVENOUS at 08:39

## 2020-05-05 RX ADMIN — FENTANYL CITRATE 50 MCG: 50 INJECTION INTRAMUSCULAR; INTRAVENOUS at 08:41

## 2020-05-05 RX ADMIN — PROPOFOL 200 MCG/KG/MIN: 10 INJECTION, EMULSION INTRAVENOUS at 08:41

## 2020-05-05 RX ADMIN — PROPOFOL 70 MG: 10 INJECTION, EMULSION INTRAVENOUS at 08:41

## 2020-05-05 RX ADMIN — SODIUM CHLORIDE, POTASSIUM CHLORIDE, SODIUM LACTATE AND CALCIUM CHLORIDE: 600; 310; 30; 20 INJECTION, SOLUTION INTRAVENOUS at 08:39

## 2020-05-05 ASSESSMENT — PULMONARY FUNCTION TESTS
PIF_VALUE: 0
PIF_VALUE: 1
PIF_VALUE: 0

## 2020-05-05 ASSESSMENT — PAIN DESCRIPTION - DESCRIPTORS: DESCRIPTORS: ACHING;TENDER

## 2020-05-05 ASSESSMENT — PAIN SCALES - GENERAL
PAINLEVEL_OUTOF10: 0

## 2020-05-05 ASSESSMENT — PAIN - FUNCTIONAL ASSESSMENT: PAIN_FUNCTIONAL_ASSESSMENT: 0-10

## 2020-05-05 ASSESSMENT — ENCOUNTER SYMPTOMS: SHORTNESS OF BREATH: 1

## 2020-05-05 NOTE — ANESTHESIA PRE PROCEDURE
Department of Anesthesiology  Preprocedure Note       Name:  Shilpi Kidd   Age:  49 y.o.  :  1965                                          MRN:  [id number]         Date:  2020      Surgeon: Babatunde Dan):  Marquis Aydin DO    Procedure: PUDENDAL BLOCK TRIGGER POINT WITH EXPERAL AND KENALOG (N/A )    Medications prior to admission:   Prior to Admission medications    Medication Sig Start Date End Date Taking? Authorizing Provider   Multiple Vitamins-Minerals (THERAPEUTIC MULTIVITAMIN-MINERALS) tablet Take 1 tablet by mouth daily   Yes Historical Provider, MD   IODINE EX     Historical Provider, MD   Turmeric 450 MG CAPS Take by mouth    Historical Provider, MD       Current medications:    Current Facility-Administered Medications   Medication Dose Route Frequency Provider Last Rate Last Dose    0.9 % sodium chloride infusion   Intravenous Continuous Rell Mcfarland MD        lactated ringers infusion   Intravenous Continuous Rell Mcfarland MD        sodium chloride flush 0.9 % injection 10 mL  10 mL Intravenous 2 times per day Rell Mcfarland MD        sodium chloride flush 0.9 % injection 10 mL  10 mL Intravenous PRN Rell Mcfarland MD        midazolam (VERSED) injection 2 mg  2 mg Intravenous Once PRN Rell Mcfarland MD        triamcinolone acetonide (KENALOG-40) 40 MG/ML injection             bupivacaine liposome (EXPAREL) 1.3 % injection                Allergies:     Allergies   Allergen Reactions    Biaxin [Clarithromycin] Hives    Minocycline Hives    Prozac [Fluoxetine Hcl] Hives       Problem List:    Patient Active Problem List   Diagnosis Code    Cyst of skin and subcutaneous tissue L72.0    Neoplasm of uncertain behavior of skin D48.5    Abdominal pain R10.9    Cystocele [id number]    Rectocele N81.6    Inflamed epidermoid cyst of skin L72.3    Fibroid D21.9    H/O LEEP Z98.890       Past Medical History:        Diagnosis Date    Abnormal Pap smear of cervix     Anxiety     Arthritis     NECK    Dental

## 2020-05-28 LAB
ALBUMIN SERPL-MCNC: NORMAL G/DL
ALP BLD-CCNC: NORMAL U/L
ALT SERPL-CCNC: NORMAL U/L
ANION GAP SERPL CALCULATED.3IONS-SCNC: NORMAL MMOL/L
AST SERPL-CCNC: NORMAL U/L
AVERAGE GLUCOSE: NORMAL
BASOPHILS ABSOLUTE: NORMAL
BASOPHILS RELATIVE PERCENT: NORMAL
BILIRUB SERPL-MCNC: NORMAL MG/DL
BILIRUBIN, URINE: NORMAL
BLOOD, URINE: NORMAL
BUN BLDV-MCNC: NORMAL MG/DL
CALCIUM SERPL-MCNC: NORMAL MG/DL
CHLORIDE BLD-SCNC: NORMAL MMOL/L
CHOLESTEROL, TOTAL: 207 MG/DL
CHOLESTEROL/HDL RATIO: ABNORMAL
CLARITY: NORMAL
CO2: NORMAL
COLOR: NORMAL
CREAT SERPL-MCNC: NORMAL MG/DL
EOSINOPHILS ABSOLUTE: NORMAL
EOSINOPHILS RELATIVE PERCENT: NORMAL
GFR CALCULATED: NORMAL
GLUCOSE BLD-MCNC: NORMAL MG/DL
GLUCOSE URINE: NORMAL
HBA1C MFR BLD: 6.1 %
HCT VFR BLD CALC: NORMAL %
HDLC SERPL-MCNC: 98 MG/DL (ref 35–70)
HEMOGLOBIN: NORMAL
HIV AG/AB: NORMAL
KETONES, URINE: NORMAL
LDL CHOLESTEROL CALCULATED: 95 MG/DL (ref 0–160)
LEUKOCYTE ESTERASE, URINE: NORMAL
LYMPHOCYTES ABSOLUTE: NORMAL
LYMPHOCYTES RELATIVE PERCENT: NORMAL
MCH RBC QN AUTO: NORMAL PG
MCHC RBC AUTO-ENTMCNC: NORMAL G/DL
MCV RBC AUTO: NORMAL FL
MONOCYTES ABSOLUTE: NORMAL
MONOCYTES RELATIVE PERCENT: NORMAL
NEUTROPHILS ABSOLUTE: NORMAL
NEUTROPHILS RELATIVE PERCENT: NORMAL
NITRITE, URINE: NORMAL
PDW BLD-RTO: NORMAL %
PH UA: NORMAL
PLATELET # BLD: NORMAL 10*3/UL
PMV BLD AUTO: NORMAL FL
POTASSIUM SERPL-SCNC: NORMAL MMOL/L
PROTEIN UA: NORMAL
RBC # BLD: NORMAL 10*6/UL
SODIUM BLD-SCNC: NORMAL MMOL/L
SPECIFIC GRAVITY, URINE: NORMAL
T4 FREE: NORMAL
TOTAL PROTEIN: NORMAL
TRIGL SERPL-MCNC: 71 MG/DL
TSH SERPL DL<=0.05 MIU/L-ACNC: NORMAL M[IU]/L
UROBILINOGEN, URINE: NORMAL
VITAMIN D 25-HYDROXY: NORMAL
VITAMIN D2, 25 HYDROXY: NORMAL
VITAMIN D3,25 HYDROXY: NORMAL
VLDLC SERPL CALC-MCNC: 14 MG/DL
WBC # BLD: NORMAL 10*3/UL

## 2020-06-04 PROBLEM — Z98.890 POST-OPERATIVE STATE: Status: RESOLVED | Noted: 2020-05-05 | Resolved: 2020-06-04

## 2020-06-15 ENCOUNTER — OFFICE VISIT (OUTPATIENT)
Dept: FAMILY MEDICINE CLINIC | Age: 55
End: 2020-06-15
Payer: COMMERCIAL

## 2020-06-15 VITALS
BODY MASS INDEX: 32.46 KG/M2 | WEIGHT: 194.8 LBS | HEART RATE: 52 BPM | TEMPERATURE: 98 F | SYSTOLIC BLOOD PRESSURE: 137 MMHG | DIASTOLIC BLOOD PRESSURE: 75 MMHG | OXYGEN SATURATION: 96 % | HEIGHT: 65 IN

## 2020-06-15 PROCEDURE — 3017F COLORECTAL CA SCREEN DOC REV: CPT | Performed by: FAMILY MEDICINE

## 2020-06-15 PROCEDURE — 1036F TOBACCO NON-USER: CPT | Performed by: FAMILY MEDICINE

## 2020-06-15 PROCEDURE — G8427 DOCREV CUR MEDS BY ELIG CLIN: HCPCS | Performed by: FAMILY MEDICINE

## 2020-06-15 PROCEDURE — G8417 CALC BMI ABV UP PARAM F/U: HCPCS | Performed by: FAMILY MEDICINE

## 2020-06-15 PROCEDURE — 99214 OFFICE O/P EST MOD 30 MIN: CPT | Performed by: FAMILY MEDICINE

## 2020-06-15 RX ORDER — ZOSTER VACCINE RECOMBINANT, ADJUVANTED 50 MCG/0.5
0.5 KIT INTRAMUSCULAR SEE ADMIN INSTRUCTIONS
Qty: 0.5 ML | Refills: 1 | Status: SHIPPED | OUTPATIENT
Start: 2020-06-15 | End: 2020-12-12

## 2020-06-15 RX ORDER — ERGOCALCIFEROL (VITAMIN D2) 1250 MCG
50000 CAPSULE ORAL WEEKLY
Qty: 4 CAPSULE | Refills: 3 | Status: SHIPPED | OUTPATIENT
Start: 2020-06-15 | End: 2020-09-14

## 2020-06-15 RX ORDER — EPINEPHRINE NASAL SOLUTION 1 MG/ML
SOLUTION NASAL
COMMUNITY
End: 2020-09-14

## 2020-06-15 RX ORDER — TURMERIC 100 %
POWDER (GRAM) MISCELLANEOUS
COMMUNITY

## 2020-06-15 ASSESSMENT — PATIENT HEALTH QUESTIONNAIRE - PHQ9
2. FEELING DOWN, DEPRESSED OR HOPELESS: 0
SUM OF ALL RESPONSES TO PHQ QUESTIONS 1-9: 0
SUM OF ALL RESPONSES TO PHQ9 QUESTIONS 1 & 2: 0
1. LITTLE INTEREST OR PLEASURE IN DOING THINGS: 0
SUM OF ALL RESPONSES TO PHQ QUESTIONS 1-9: 0

## 2020-06-15 NOTE — PROGRESS NOTES
General FM note    Velia Puri is a 49 y.o. female who presents today for follow up on her  medical conditions as noted below.   Velia Puri is c/o of   Chief Complaint   Patient presents with   [address]       Patient Active Problem List:     Cyst of skin and subcutaneous tissue     Neoplasm of uncertain behavior of skin     Abdominal pain     Cystocele     Rectocele     Inflamed epidermoid cyst of skin     Fibroid     H/O LEEP     Past Medical History:   Diagnosis Date    Abnormal Pap smear of cervix     Anxiety     Arthritis     NECK    Dental crowns present     UPPER ;AND LOWER    Depression     Difficult intravenous access     AVIOID RT HAND    Dizziness     R/T H-PYLORI    Dysphasia     states has cleared up    Ganglion cyst     LT WRIST. MID BACK    H pylori ulcer 2016    ABD-TREATED WITH ATB FEELING BETTER    HPV in female     Hypothyroidism     Nausea     R/T H PYLORI    Osteoarthritis     Rectal prolapse     SAB (spontaneous )     Shortness of breath     AT TIMES    Wears glasses     Wears glasses       Past Surgical History:   Procedure Laterality Date    APPENDECTOMY      COLONOSCOPY  2016    PT STATES NORMAL    CYST REMOVAL      LT WRIST GANGLION CYSTECTOMY    ENDOMETRIAL ABLATION      OTHER SURGICAL HISTORY  2015    EXCISION LESIONS MID BACK X2    OTHER SURGICAL HISTORY      pre cancer cells removed from cervix    OTHER SURGICAL HISTORY  10/31/2016    EXCISION SCALP LEISION X3    TUBAL LIGATION  1992    UPPER GASTROINTESTINAL ENDOSCOPY  2016    with biopsy    URETHRAL SURGERY N/A 2020    PUDENDAL BLOCK TRIGGER POINT WITH EXPERAL AND KENALOG performed by Josef Kim DO at [address] [place] [address]  16    vag A/P repair with cysto    VAGINA SURGERY  2020    PUDENDAL BLOCK TRIGGER POINT WITH EXPERAL AND KENALOG (N/A )     Family History   Problem Relation Age of Onset    Diabetes Mother    Tiffany Castillo Hypertension Mother     Cancer Mother         KIDNEY    Other Mother         [address] Arthritis Father         HIP REPLACEMENT    Other Sister         MENTAL DISORDER    Other Sister         FIBROMYALGIA, MENTAL HEALTH ISSUES    Arthritis Sister      Current Outpatient Medications   Medication Sig Dispense Refill    Methylsulfonylmethane POWD by Does not apply route      Turmeric POWD by Does not apply route      ergocalciferol (ERGOCALCIFEROL) 1.25 MG (93901 UT) capsule Take 1 capsule by mouth once a week 4 capsule 3    zoster recombinant adjuvanted vaccine (SHINGRIX) 50 MCG/0.5ML SUSR injection Inject 0.5 mLs into the muscle See Admin Instructions 1 dose now and repeat in 2-6 months 0.5 mL 1    Multiple Vitamins-Minerals (THERAPEUTIC MULTIVITAMIN-MINERALS) tablet Take 1 tablet by mouth daily      IODINE EX       Turmeric 450 MG CAPS Take by mouth      ibuprofen (ADVIL;MOTRIN) 600 MG tablet Take 1 tablet by mouth every 6 hours as needed for Pain (Patient not taking: Reported on 6/15/2020) 30 tablet 1     No current facility-administered medications for this visit. ALLERGIES:    Allergies   Allergen Reactions    Biaxin [Clarithromycin] Hives    Minocycline Hives    Prozac [Fluoxetine Hcl] Hives       Social History     Tobacco Use    Smoking status: Never Smoker    Smokeless tobacco: Never Used   Substance Use Topics    Alcohol use: Yes     Alcohol/week: 1.0 standard drinks     Types: 1 Standard drinks or equivalent per week     Comment: BEER, WINE OR MIXED DRINKS 4 DRINKS A MONTH      Body mass index is 32.42 kg/m². /75   Pulse 52   Temp 98 °F (36.7 °C)   Ht 5' 5\" (1.651 m)   Wt 194 lb 12.8 oz (88.4 kg)   LMP 10/30/2016 Comment: negative. SpO2 96%   BMI 32.42 kg/m²     Subjective:      HPI    71-year-old female coming today for follow-up of her blood work but also discussed her ongoing problems. Still has been having hot spots at her skull area.   And when

## 2020-06-18 ENCOUNTER — TELEPHONE (OUTPATIENT)
Dept: FAMILY MEDICINE CLINIC | Age: 55
End: 2020-06-18

## 2020-06-18 RX ORDER — TIZANIDINE 4 MG/1
4 TABLET ORAL 3 TIMES DAILY PRN
Qty: 30 TABLET | Refills: 0 | Status: SHIPPED | OUTPATIENT
Start: 2020-06-18 | End: 2020-09-14

## 2020-06-24 ENCOUNTER — HOSPITAL ENCOUNTER (OUTPATIENT)
Dept: PHYSICAL THERAPY | Facility: CLINIC | Age: 55
Setting detail: THERAPIES SERIES
Discharge: HOME | End: 2020-06-24
Payer: COMMERCIAL

## 2020-06-24 PROCEDURE — 97161 PT EVAL LOW COMPLEX 20 MIN: CPT

## 2020-06-24 PROCEDURE — 97110 THERAPEUTIC EXERCISES: CPT

## 2020-06-24 NOTE — CONSULTS
Return to work: Work activities/duties NA     Pain:  [x] Yes  [] No Location: L cervical spine pain- into BEREKET, and SCM Pain Rating: (0-10 scale) 7/10- PRESENT  Pain altered Tx:  [] Yes  [x] No  Action: N/A  Symptoms:  [] Improving [x] Worsening [] Same  Better:  [] AM    [] PM    [] Sit    [] Rise/Sit    []Stand    [] Walk    [] Lying    [] Other:  Worse: [] AM    [] PM    [] Sit    [] Rise/Sit    []Stand    [] Walk    [] Lying    [] Bend                      [] Valsalva    [] Other: \"reaching above my head; dishes; pushing/pulling; anything. \"   Sleep: [] OK    [x] Disturbed- intermittent, but able to reposition and fall back asleep     Reports intermittent L 4-5 digit N/T. Unable to report pattern. Also unable to report if truly present. Denies recent falls, injuries that may have affected/flared-up cervical spine.      List of Red Flags:    Cervical Myelopathy Normal (-)/Abnormal (+)   Sensory disturbances in hand -   Wasting of hand muscles -   Unsteady gait -   Fournier's reflex -   Hyper-reflexia -   Bladder and bowel problems- incontinence or constipation of urine or fecal matter -   Multi-segmental weakness and/or sensory disturbances -     Neoplastic Conditions Normal (-)/Abnormal (+)   Age > 48 y.o. -   Previous history of cancer -; high risk, pre-cancerous within cervix   Unexplained weight loss- >10lb in 1 week -   Constant pain -   No relief of pain with bed rest -   Night pain- not relieved with change in body position -     Upper Cervical Ligamentous Instabilities Normal (-)/Abnormal (+)   Occipital headache and numbness -   Severe limitation during neck ROM in all directions -   Signs of cervical myelopathy -     Vertebral Artery Insufficiencies- VBI Normal (-)/Abnormal (+)   Drop attack -   Dizziness/light headedness related to neck movement +; \"with certain neck movements\"    Dysphasia -   Dysarthria -   Diplopia -   Positive cranial nerve signs -     Objective:      STRENGTH  ROM    Left Right steps better. \"     Rehab Potential:  [] Good  [x] Fair  [x] Poor   Suggested Professional Referral:  [x] No  [] Yes:  Barriers to Goal Achievement:  [] No  [x] Yes: prolonged or chronic history of condition; no relief with PT services in past; poor pain management. Domestic Concerns:  [x] No  [] Yes:    Pt. Education:  [x] Plans/Goals, Risks/Benefits discussed  [x] Home exercise program  Method of Education: [x] Verbal  [x] Demo  [x] Written  Comprehension of Education:  [x] Verbalizes understanding. [] Demonstrates understanding. [x] Needs Review. [] Demonstrates/verbalizes understanding of HEP/Ed previously given. Treatment Plan:  [x] Therapeutic Exercise   91490  [] Iontophoresis: 4 mg/mL Dexamethasone Sodium Phosphate  mAmin  82873   [] Therapeutic Activity  53835 [] Vasopneumatic cold with compression  96588    [] Gait Training   83346 [] Ultrasound   46297   [] Neuromuscular Re-education  27099 [] Electrical Stimulation Unattended  01889   [x] Manual Therapy  78573 [] Electrical Stimulation Attended  68275   [x] Instruction in HEP  [] Lumbar/Cervical Traction  97318   [] Aquatic Therapy   18103 [x] Cold/hotpack    [] Massage   60589      [] Dry Needling, 1 or 2 muscles  63351   [] Biofeedback, first 15 minutes   38331  [] Biofeedback, additional 15 minutes   77237 [] Dry Needling, 3 or more muscles  83957     []  Medication allergies reviewed for use of    Dexamethasone Sodium Phosphate 4mg/ml     with iontophoresis treatments. Pt is not allergic. Frequency:  2 x/week for 12 visits    Todays Treatment:  Modalities: HP, CP- PRN    Precautions: chronic L cervical spine pain into BEREKET and SCM; previous PT services without relief   Exercises: schedule x1 week at a time  Exercise Reps/ Time Weight/ Level Comments   Supine L cerv rotation isometric X10, 5\"  HEP   Seated cervical rotation AROM X10, 5\" ea  HEP   Seated scapular retractions X10, 5\"  HEP   Seated BEREKET str. x30\"  HEP   Seated SCM str.

## 2020-06-30 ENCOUNTER — HOSPITAL ENCOUNTER (OUTPATIENT)
Dept: PHYSICAL THERAPY | Facility: CLINIC | Age: 55
Setting detail: THERAPIES SERIES
Discharge: HOME | End: 2020-06-30
Payer: COMMERCIAL

## 2020-06-30 PROCEDURE — 97110 THERAPEUTIC EXERCISES: CPT

## 2020-06-30 PROCEDURE — 97140 MANUAL THERAPY 1/> REGIONS: CPT

## 2020-06-30 NOTE — FLOWSHEET NOTE
[] [facility]  [address]  [phone]  F: [phone] [] [address]  [facility] [address]   [address]  P: [phone]  F: [phone] [] [address]  [address] [facility]  P: [phone]  F: [phone] [] [address]  [facility]   [address]   [place]: [phone]  F: [phone]      Physical Therapy Daily Treatment Note    Date:  2020  Patient Name:  Sharlene Patinochild    :  1965  MRN: [id number]  Physician: Love Lee              Insurance: Dru Pillai- 13 VISITS REMAINING  Medical Diagnosis: S16. 1XXA- strain of neck                     Rehab Codes: S16. 1XXA  Onset Date: 2015- flare-up                      Next 's appt.: TBD- pending success with PT services  Visit# / total visits: 2/12     Cancels/No Shows: 0    Subjective:    Pain:  [x] Yes  [] No Location: neck, between shoulder blades Pain Rating: (0-10 scale) 2/10  Pain altered Tx:  [] No  [] Yes  Action:  Comments: Repots HEP compliance and is pleased with the exercises. States se is feeling a lot of tension in her neck today. Objective:  Modalities: HP, CP- PRN    Precautions: chronic L cervical spine pain into BEREKET and SCM; previous PT services without relief   Exercises: schedule x1 week at a time  Exercise Reps/ Time Weight/ Level Comments   UBE 5'     Supine L cerv rotation isometric X10, 5\"   HEP   Seated cervical rotation AROM X10, 5\" ea   HEP   Seated scapular retractions X10, 5\"   HEP   Seated BEREKET str.  x30\"   HEP   Seated SCM str.  3x30\"   HEP    Seated cervical retractions  x10, 3\"    HEP    UT stretch  3x20\"    HEP    Levator Stretch   3x20\"    HEP                       Other:   Manual: SOR, cervical PROM into L rotation/SB, DI to B UTs     Specific Instructions for next treatment: frequency toward long and short term goals.     [x] Specific Instructions for subsequent treatments:       Time In: 1:00 pm           Time Out: 1:55 pm    Electronically signed by:  Pippa Nogueira PTA

## 2020-07-01 ENCOUNTER — HOSPITAL ENCOUNTER (OUTPATIENT)
Dept: PHYSICAL THERAPY | Facility: CLINIC | Age: 55
Setting detail: THERAPIES SERIES
Discharge: HOME | End: 2020-07-01
Payer: COMMERCIAL

## 2020-07-01 PROCEDURE — 97110 THERAPEUTIC EXERCISES: CPT

## 2020-07-01 NOTE — FLOWSHEET NOTE
Supine L cerv rotation isometric X10, 5\"   HEP   Seated cervical rotation AROM X10, 5\" ea   HEP   Seated scapular retractions X10, 5\"   HEP   Seated BEREKET str. x30\"   HEP   Seated SCM str.  3x30\"   HEP    Seated cervical retractions  x10, 3\"    HEP    UT stretch  3x20\"    HEP    Levator Stretch   3x20\"    HEP             L hip screen, LEFS   COMPLETED- 7/1/2020   Prone hip extension  X10, 5\" ea  HEP  B LEs   Supine bridge  X10, 5\" ea  HEP   Supine piriformis str. - IR- L 3x30\" ea  HEP   S/L CLAM x20 ea BTB HEP  B LEs   Lunge hip flexor str. HEP- REVIEW NEXT VISIT             Other:     Manual: SOR, cervical PROM into L rotation/SB, DI to B UTs- HELD 7/1/2020 FOR L HIP SCREEN    R hip strength:   4-/5- hip flexion  4+/5- quad/HS  4/5- hip IR/ER  4+/5- hip ABD/ADD- but inc hip flexor activity     L hip strength:  4-/5- hip flexion  4+/5- quad/HS  4/5- hip IR/ER  4+/5- hip ABD/ADD- but inc hip flexor activity     B knee AROM WNL, SYMMETRICAL, PAIN-FREE. Reports TTP L anterior hip- near psoas; also two sensitive spots within L quad. Also L PSIS- \"cramp. \"  Relief/good stretch sensation when performs positioning for R hip ADD MMT- indicating piriformis. LEFS- see paper chart- 29/80     Specific Instructions for next treatment: Begin split treatments of cervical spine and L hip next visit. Follow-up with pt regarding tolerance to new, updated HEP handout for L hip. May consider FR to L quad/ITB and TPR to hip flexor. Treatment Charges: Mins Units   []  Modalities     [x]  Ther Exercise 40 2   []  Manual Therapy     []  Ther Activities     []  Aquatics     []  Vasocompression     []  Other     Total Treatment time 40 2     Assessment: [] Progressing toward goals. [x] No change. [x] Other: Pt presents with continued L hip pain, and therefore, performed screen this date. Results as detailed above- potential chronic L hip pain secondary to previous traumas.  Would benefit from hip flexor stretching and gross hip strengthening. Goals detailed below. Begin split treatments for L cervical and L hip pain next visit. x1 week secondary to visit limitations. Consider ideas above and below next visit. [x] Patient would continue to benefit from skilled physical therapy services- in order to inc L cervical SB AROM, deep cervical flexor strength, L shoulder strength, L hip flexibility/strength for improved ADLs and QOL. STG: (to be met in 6 treatments)  1. ? Pain: Patient will report < 7/10 pain at rest and < 10/10 pain with active movement in order to improve QOL. 2. ? ROM: Will demo L cervical SB AROM to equal R and will report < 7/10 P with all other cervical AROM in order to improve visual scanning of the environment and OH gaze. Will also report < 7/10 P with OH reaching of L UE in order to improve functional reaching. 3. ? Strength: Will demo 4+/5 L shoulder gross strength with < 7/10 P in order to better match R and improve functional lifting. 4. ? Function: Will report < 5/10 P with light housework. 5. Patient to be independent with home exercise program as demonstrated by performance with correct form without cues. LTG: (to be met in 12 treatments)  1. Will report < 7/10 P with MOD housework. 2. Improve NDI to 30%    L hip goals- 7/1/2020:  1. Patient will report < 3/10 pain at rest and < 8/10 pain with active movement in order to improve QOL. 2. Will demo L hip flexor/quad flexibility to equal R and with dec complaints of \"tightness\" in order to improve functional mobility. 3. Will demo 4+/5 B hip IR/ER; hip ABD with dec hip flexor activation; and proper squat form in order to prevent future injuries. 4. Patient will report decreased TTP to L hip flexor and quad in order to indicate decreased inflammation and improved healing of injured site. 4. Improve LEFS to 10 points better     Pt.  Education:  [x] Yes  [] No  [] Reviewed Prior HEP/Ed  Method of Education: [x] Verbal  [x] Demo  [x]

## 2020-07-10 ENCOUNTER — HOSPITAL ENCOUNTER (OUTPATIENT)
Dept: PHYSICAL THERAPY | Facility: CLINIC | Age: 55
Setting detail: THERAPIES SERIES
Discharge: HOME | End: 2020-07-10
Payer: COMMERCIAL

## 2020-07-10 PROCEDURE — 97110 THERAPEUTIC EXERCISES: CPT

## 2020-07-10 NOTE — FLOWSHEET NOTE
[] [facility]  [address]  [phone]  F: [phone] [] [address]  [facility] 36   [address]  P: [phone]  F: [phone] [x] [place]  [address] [facility]  P: [phone]  F: [phone] [] [address]  [facility]   [address]   [place]: [phone]  F: [phone]      Physical Therapy Daily Treatment Note    Date:  7/10/2020  Patient Name:  Penelope Almanza \"Alicia\"   :  1965  MRN: [id number]  Physician: Fazal Baltazar              Insurance: Mago Townsend- 13 VISITS REMAINING  Medical Diagnosis: S16. 1XXA- strain of neck                     Rehab Codes: S16. 1XXA  Onset Date: 2015- flare-up                      Next 's appt.: TBD- pending success with PT services  Visit# / total visits:      Cancels/No Shows: 0    Subjective:    Pain:  [x] Yes  [] No Location: neck, between shoulder blades; L posterior/anterior hip- into quad   Pain Rating: (0-10 scale) 3/10- at present/constant  Pain altered Tx:  [x] No  [] Yes  Action: N/A  Comments: Pt mentioned that she is feeling better but not healed. Objective:  Modalities: HP, CP- PRN    Precautions: chronic L cervical spine pain into BEREKET and SCM; also chronic L hip pain; previous PT services without relief   Exercises: requests services at x1 week  Exercise Reps/ Time Weight/ Level Comments    UBE 8'   x   Supine L cerv rotation isometric X10, 5\"     x   Seated cervical rotation AROM X10, 5\" ea    x   Seated scapular retractions X10, 5\"    x    Seated cervical retractions  x10, 3\"    x    UT stretch  3x30\"    x    Levator Stretch   3x30\"    x                      Prone hip extension  X10, 5\" ea    B LEs x   Supine bridge  X10, 5\" ea   x   Supine piriformis str. - IR- L 3x30\" ea   x   S/L CLAM x20 ea BTB BLE x   Lunge hip flexor str. Reviewed and educated x              Other:         Specific Instructions for next treatment:   May consider FR to L quad/ITB and TPR to hip flexor. Treatment Charges: Mins Units   []  Modalities     [x]  Ther Exercise 40 3   []  Manual Therapy     []  Ther Activities     []  Aquatics     []  Vasocompression     []  Other     Total Treatment time 40 3     Assessment: [] Progressing toward goals. [] No change. [x] Other: Pt was able to complete the stretches and exercises with no increased symptoms but did have difficulty performing correctly requiring mod cueing for proper technique and mechanics. Pt demonstrated a lot of movements not in the POC that she does on her own or wishes to do in the future which were bigger movements that did not recreate any symptoms. Performed tiger  to pt quad and educated pt on benefits of rolling on her own also. [x] Patient would continue to benefit from skilled physical therapy services- in order to inc L cervical SB AROM, deep cervical flexor strength, L shoulder strength, L hip flexibility/strength for improved ADLs and QOL. STG: (to be met in 6 treatments)  1. ? Pain: Patient will report < 7/10 pain at rest and < 10/10 pain with active movement in order to improve QOL. 2. ? ROM: Will demo L cervical SB AROM to equal R and will report < 7/10 P with all other cervical AROM in order to improve visual scanning of the environment and OH gaze. Will also report < 7/10 P with OH reaching of L UE in order to improve functional reaching. 3. ? Strength: Will demo 4+/5 L shoulder gross strength with < 7/10 P in order to better match R and improve functional lifting. 4. ? Function: Will report < 5/10 P with light housework. 5. Patient to be independent with home exercise program as demonstrated by performance with correct form without cues. LTG: (to be met in 12 treatments)  1.  Will report < 7/10 P with MOD housework. 2. Improve NDI to 30%    L hip goals- 7/1/2020:  1. Patient will report < 3/10 pain at rest and < 8/10 pain with active movement in order to improve QOL. 2. Will demo L hip flexor/quad flexibility to equal R and with dec complaints of \"tightness\" in order to improve functional mobility. 3. Will demo 4+/5 B hip IR/ER; hip ABD with dec hip flexor activation; and proper squat form in order to prevent future injuries. 4. Patient will report decreased TTP to L hip flexor and quad in order to indicate decreased inflammation and improved healing of injured site. 4. Improve LEFS to 10 points better     Pt. Education:  [x] Yes  [] No  [] Reviewed Prior HEP/Ed  Method of Education: [x] Verbal  [x] Demo  [x] Written  Comprehension of Education:  [x] Verbalizes understanding. [x] Demonstrates understanding. [x] Needs review. [] Demonstrates/verbalizes HEP/Ed previously given. 7/1/2020- All above in grid labeled as HEP issued for home- verbalized understanding to all. Issued new, updated HEP handout. Plan: [x] Continue current frequency toward long and short term goals. [x] Specific Instructions for subsequent treatments: Begin split treatments of L cervical spine and L hip next visit. Follow-up with pt regarding tolerance to new, updated HEP handout for L hip. May consider FR to L quad/ITB and TPR to hip flexor.        Time In: 10:00am          Time Out:  10:51am    Electronically signed by:  Nancy Siegel PTA

## 2020-07-16 ENCOUNTER — HOSPITAL ENCOUNTER (OUTPATIENT)
Dept: PHYSICAL THERAPY | Facility: CLINIC | Age: 55
Setting detail: THERAPIES SERIES
Discharge: HOME | End: 2020-07-16
Payer: COMMERCIAL

## 2020-07-16 PROCEDURE — 97140 MANUAL THERAPY 1/> REGIONS: CPT

## 2020-07-16 PROCEDURE — 97110 THERAPEUTIC EXERCISES: CPT

## 2020-07-16 NOTE — FLOWSHEET NOTE
[] [facility] [facility]  [address]  [phone]  F: [phone] [] [address]  [facility] 36   [address]  P: [phone]  F: [phone] [x] [place]  [address] [facility]  P: [phone]  F: [phone] [] [address]  [facility]   [address]   [place]: [phone]  F: [phone]      Physical Therapy Daily Treatment Note    Date:  2020  Patient Name:  Diana Amend \"Alicia\"   :  1965  MRN: [id number]  Physician: Connor Marroquin              Insurance: [employer]- 13 VISITS REMAINING  Medical Diagnosis: S16. 1XXA- strain of neck                     Rehab Codes: S16. 1XXA  Onset Date: 2015- flare-up                      Next 's appt.: TBD- pending success with PT services  Visit# / total visits:      Cancels/No Shows: 0    Subjective:    Pain:  [x] Yes  [] No Location: neck, between shoulder blades; L posterior/anterior hip- into quad   Pain Rating: (0-10 scale) 2-4/10- at present/constant  Pain altered Tx:  [x] No  [] Yes  Action: N/A  Comments: Reports 2-4/10 \"dull ache\" at present throughout regions listed above. \"It's always achey; feels like I have cement in my L hip when I work out; and my back just tightens up. \" Reports good tolerance to last visit, which included MT techniques. Also good compliance, tolerance to current HEP interventions. Pt talkative in nature and questions PT consistently throughout active warm-up regarding rationale for treatment techniques, as well as causes for her pain and symptoms- despite several previous discussions.       Objective:  Modalities: HP, CP- PRN    Precautions: chronic L cervical spine pain into BEREKET and SCM; also chronic L hip pain; previous PT services without relief   Exercises: requests services at x1 week- HALF CERVICAL, HALF L HIP each session   Exercise Reps/ Time Weight/ Level Comments    UBE x6' L3 B UEs x   Wall-slide flexion X10, 5\"   x   [employer] [employer]  X10, 5\"  At wall  For OH strengthening, neutral cervical spine  x   Supine SA punches X5, 5\"  Ceased d/t inc P and muscle spasm  Modified to below  x   Supine BHABD, B ER x15 YTB Denies inc pain  x   Supine L cerv rotation isometric x10, 5\"   HEP- reviewed   CUES OF ALL TYPES  x   Seated cervical rotation AROM X10, 5\" ea   HEP    Seated scapular retractions X10, 5\"   HEP    Seated cervical retractions  x10, 3\"   HEP    UT stretch  3x30\"   HEP    Levator Stretch   3x30\"   HEP                       Prone hip extension  X10, 5\" ea  HEP  B LEs    Supine bridge  x20, 5\"  HEP- reviewed  Added BTB at knees- 7/16/2020 x   Supine piriformis str. - IR- L 3x30\" ea  HEP    S/L CLAM x20 ea BTB B LES  HEP- reviewed without TB this date  CUES x   Lunge hip flexor str. Reviewed and educated  HEP               Other: BOLD is completed. MT techniques: FR to L quad; followed with passive stretching off EOB for quad and hip flexor- reports relief; then FR to ITB- GENTLE- sig TTP mid-ITB- approx. x8' total.     Specific Instructions for next treatment: Issue new, updated HEP handout of above bolded interventions. Consider EOB L ITB stretching next visit. Treatment Charges: Mins Units   []  Modalities     [x]  Ther Exercise 40 2   [x]  Manual Therapy 8 1   []  Ther Activities     []  Aquatics     []  Vasocompression     []  Other     Total Treatment time 48 3     [x6' on UBE]    Assessment: [] Progressing toward goals. [] No change. [x] Other: Pt presents with similar pain ratings as previous session- continues to report constant ache and tightness. However, reports good compliance, tolerance to current HEP interventions, and desires to return to fitness. Therefore, focused upon new strengthening interventions for both cervical spine, as well as L hip.  Pt tolerates new interventions well, except inc spasm and pain with supine SA punches- modified to performing YTB B HABD, ER. Also continued foam roller to L quad/ITB secondary to sig complaints of \"tightness\"- reports relief. Progression of session limited secondary to pt talkative in nature and fixating on origins of her pain and symptoms- despite several previous discussions. Consider ideas above next visit. [x] Patient would continue to benefit from skilled physical therapy services- in order to inc L cervical SB AROM, deep cervical flexor strength, L shoulder strength, L hip flexibility/strength for improved ADLs and QOL. STG: (to be met in 6 treatments)  1. ? Pain: Patient will report < 7/10 pain at rest and < 10/10 pain with active movement in order to improve QOL. 2. ? ROM: Will demo L cervical SB AROM to equal R and will report < 7/10 P with all other cervical AROM in order to improve visual scanning of the environment and OH gaze. Will also report < 7/10 P with OH reaching of L UE in order to improve functional reaching. 3. ? Strength: Will demo 4+/5 L shoulder gross strength with < 7/10 P in order to better match R and improve functional lifting. 4. ? Function: Will report < 5/10 P with light housework. 5. Patient to be independent with home exercise program as demonstrated by performance with correct form without cues. LTG: (to be met in 12 treatments)  1. Will report < 7/10 P with MOD housework. 2. Improve NDI to 30%    L hip goals- 7/1/2020:  1. Patient will report < 3/10 pain at rest and < 8/10 pain with active movement in order to improve QOL. 2. Will demo L hip flexor/quad flexibility to equal R and with dec complaints of \"tightness\" in order to improve functional mobility. 3. Will demo 4+/5 B hip IR/ER; hip ABD with dec hip flexor activation; and proper squat form in order to prevent future injuries.    4. Patient will report decreased TTP to L hip flexor and quad in order to indicate decreased inflammation and improved healing of injured site. 4. Improve LEFS to 10 points better     Pt. Education:  [] Yes  [] No  [] Reviewed Prior HEP/Ed  Method of Education: [] Verbal  [] Demo  [] Written  Comprehension of Education:  [] Verbalizes understanding. [] Demonstrates understanding. [] Needs review. [] Demonstrates/verbalizes HEP/Ed previously given. 7/1/2020- All above in grid labeled as HEP issued for home- verbalized understanding to all. Issued new, updated HEP handout. Plan: [x] Continue current frequency toward long and short term goals. [x] Specific Instructions for subsequent treatments: Continue split treatments of L cervical spine and L hip next visit. See above.       Time In: 2:01PM          Time Out: 2:55PM    Electronically signed by:  Kushal Lu PT

## 2020-07-22 ENCOUNTER — HOSPITAL ENCOUNTER (OUTPATIENT)
Dept: PHYSICAL THERAPY | Facility: CLINIC | Age: 55
Setting detail: THERAPIES SERIES
Discharge: HOME | End: 2020-07-22
Payer: COMMERCIAL

## 2020-07-22 PROCEDURE — 97110 THERAPEUTIC EXERCISES: CPT

## 2020-07-22 NOTE — FLOWSHEET NOTE
[] [facility] [facility]  [address]  [phone]  F: [phone] [] [address]  [facility]   [address]  P: [phone]  F: [phone] [x] [place]  [address] [facility]  P: [phone]  F: [phone] [] [address]  [facility]   [address]   [place]: [phone]  F: [phone]      Physical Therapy Daily Treatment Note    Date:  2020  Patient Name:  Penelope Almanza \"Alicia\"   :  1965  MRN: [id number]  Physician: Fazal Baltazar              Insurance: Mago Townsend- 13 VISITS REMAINING  Medical Diagnosis: S16. 1XXA- strain of neck                     Rehab Codes: S16. 1XXA  Onset Date: 2015- flare-up                      Next 's appt.: TBD- pending success with PT services  Visit# / total visits:      Cancels/No Shows: 0    Subjective:    Pain:  [x] Yes  [] No Location: neck, between shoulder blades; L posterior/anterior hip- into quad   Pain Rating: (0-10 scale) 2-4/10- at present/constant  Pain altered Tx:  [x] No  [] Yes  Action: N/A  Comments: pt arrived reporting pain is a little better than last visit. Pt still feels pain is coming from neck and causing issues in hip.       Objective:  Modalities: HP, CP- PRN    Precautions: chronic L cervical spine pain into BEREKET and SCM; also chronic L hip pain; previous PT services without relief   Exercises: requests services at x1 week- HALF CERVICAL, HALF L HIP each session   Exercise Reps/ Time Weight/ Level Comments    UBE x6' L3 B UEs x   Wall-slide flexion X10, 5\"   x   [place]  X10, 5\"  At wall  For OH strengthening, neutral cervical spine  x   Supine SA punches 20x   x   Supine BHABD, B ER x15 YTB Denies inc pain  x   Supine L cerv rotation isometric x10, 5\"   HEP- reviewed   CUES OF ALL TYPES  x Seated cervical rotation AROM X10, 5\" ea   HEP    Seated scapular retractions X10, 5\"   HEP    Seated cervical retractions  x10, 3\"   HEP    UT stretch  3x30\"   HEP    Levator Stretch   3x30\"   HEP     prayer stretch 3x30\"    3 way  x           Prone hip extension  X10, 5\" ea  HEP  B LEs    Supine bridge  x20, 5\"  HEP- reviewed  Added BTB at knees- 7/16/2020 x   Supine piriformis str. - IR- L 3x30\" ea  HEP    S/L CLAM x20 ea BTB B LES  HEP- reviewed without TB this date  CUES x   Lunge hip flexor str. Reviewed and educated  HEP               Other: BOLD is completed. MT techniques: FR to L quad; followed with passive stretching off EOB for quad and hip flexor- reports relief; then FR to ITB- GENTLE- sig TTP mid-ITB- approx. x8' total.     Specific Instructions for next treatment: Issue new, updated HEP handout of above bolded interventions. Consider EOB L ITB stretching next visit. Treatment Charges: Mins Units   []  Modalities     [x]  Ther Exercise 40 3   [x]  Manual Therapy     []  Ther Activities     []  Aquatics     []  Vasocompression     []  Other     Total Treatment time 40 3     [x6' on UBE]    Assessment: [] Progressing toward goals. Initiated treatment with UBE for warm up followed by stretches and exercises. Added in prayer stretch due to lower back tightness and pain with pt noting good relief and no pain. Pt stated she feels great leaving therapy this date. Pt on vacation next week will be back first week of August.      [] No change. [x] Other:   [x] Patient would continue to benefit from skilled physical therapy services- in order to inc L cervical SB AROM, deep cervical flexor strength, L shoulder strength, L hip flexibility/strength for improved ADLs and QOL. STG: (to be met in 6 treatments)  1. ? Pain: Patient will report < 7/10 pain at rest and < 10/10 pain with active movement in order to improve QOL.    2. ? ROM: Will demo L cervical SB AROM to equal R and will report < 7/10 P with all other cervical AROM in order to improve visual scanning of the environment and OH gaze. Will also report < 7/10 P with OH reaching of L UE in order to improve functional reaching. 3. ? Strength: Will demo 4+/5 L shoulder gross strength with < 7/10 P in order to better match R and improve functional lifting. 4. ? Function: Will report < 5/10 P with light housework. 5. Patient to be independent with home exercise program as demonstrated by performance with correct form without cues. LTG: (to be met in 12 treatments)  1. Will report < 7/10 P with MOD housework. 2. Improve NDI to 30%    L hip goals- 7/1/2020:  1. Patient will report < 3/10 pain at rest and < 8/10 pain with active movement in order to improve QOL. 2. Will demo L hip flexor/quad flexibility to equal R and with dec complaints of \"tightness\" in order to improve functional mobility. 3. Will demo 4+/5 B hip IR/ER; hip ABD with dec hip flexor activation; and proper squat form in order to prevent future injuries. 4. Patient will report decreased TTP to L hip flexor and quad in order to indicate decreased inflammation and improved healing of injured site. 4. Improve LEFS to 10 points better     Pt. Education:  [x] Yes  [] No  [x] Reviewed Prior HEP/Ed  Method of Education: [x] Verbal  [] Demo  [x] Written  Comprehension of Education:  [x] Verbalizes understanding. [] Demonstrates understanding. [] Needs review. [] Demonstrates/verbalizes HEP/Ed previously given. 7/1/2020- All above in grid labeled as HEP issued for home- verbalized understanding to all. Issued new, updated HEP handout. Plan: [x] Continue current frequency toward long and short term goals. [x] Specific Instructions for subsequent treatments: Continue split treatments of L cervical spine and L hip next visit. See above.       Time In: 1:07 pm         Time Out: 1:50 pm    Electronically signed by:  Liliana Lynch PTA

## 2020-08-03 ENCOUNTER — HOSPITAL ENCOUNTER (OUTPATIENT)
Dept: PHYSICAL THERAPY | Facility: CLINIC | Age: 55
Setting detail: THERAPIES SERIES
Discharge: HOME | End: 2020-08-03
Payer: COMMERCIAL

## 2020-08-04 ENCOUNTER — HOSPITAL ENCOUNTER (OUTPATIENT)
Dept: PHYSICAL THERAPY | Facility: CLINIC | Age: 55
Setting detail: THERAPIES SERIES
Discharge: HOME | End: 2020-08-04
Payer: COMMERCIAL

## 2020-08-04 PROCEDURE — 97110 THERAPEUTIC EXERCISES: CPT

## 2020-08-04 NOTE — FLOWSHEET NOTE
[] [facility]  [address]  [phone]  F: [phone] [] [address]  [facility] 36   [address]  P: [phone]  F: [phone] [x] [address]  [address] [facility]  P: [phone]  F: [phone] [] [address] [facility]   [address]   [place]: [phone]  F: [phone]      Physical Therapy Daily Treatment Note    Date:  2020  Patient Name:  Bertrand Canada \"Alicia\"   :  1965  MRN: [id number]  Physician: Zenia Gross              Insurance: James [place]- 13 VISITS REMAINING  Medical Diagnosis: S16. 1XXA- strain of neck                     Rehab Codes: S16. 1XXA  Onset Date: 2015- flare-up                      Next 's appt.: TBD- pending success with PT services  Visit# / total visits:      Cancels/No Shows: 0    Subjective:    Pain:  [x] Yes  [] No Location: neck, between shoulder blades; L posterior/anterior hip- into quad   Pain Rating: (0-10 scale) 4/10  Pain altered Tx:  [x] No  [] Yes  Action: N/A  Comments: Pt reports pain/tightness in anterior neck muscles and L hip.       Objective:  Modalities: HP, CP- PRN    Precautions: chronic L cervical spine pain into BEREKET and SCM; also chronic L hip pain; previous PT services without relief   Exercises: requests services at x1 week- HALF CERVICAL, HALF L HIP each session   Exercise Reps/ Time Weight/ Level Comments    UBE x6' L3 B UEs x   Wall-slide flexion X10, 5\"   x   [place]  X10, 5\"  At wall  For OH strengthening, neutral cervical spine  x   Supine SA punches 20x   -   tband rows 2x10 blue  x   tband ext 2x10 lime  x   Standing HAB 2x10 blue Denies inc pain  x   B ER w/ retraction 2x10 lime  x   Supine L cerv rotation isometric x10, 5\"   HEP- reviewed   CUES OF ALL TYPES  -   Seated cervical rotation AROM X10, 5\" ea   HEP    Seated scapular retractions X10, 5\"   HEP    Seated cervical retractions  x10, 3\"   HEP    UT stretch  3x30\"   HEP    Levator Stretch   3x30\"   HEP     prayer stretch 3x30\"    3 way  x           Prone hip extension  X10, 5\" ea  HEP  B LEs    Supine bridge  x20, 5\"  HEP- reviewed  Added BTB at knees- 7/16/2020 x   Supine piriformis str. - IR- L 3x30\" ea  HEP    S/L CLAM x20 ea BTB B LES  HEP- reviewed without TB this date  CUES x   TG Squats 2x10 L16  x   tband sidestepping 2L blue  x   SL hip abduction 2x10   x          Lunge hip flexor str. Reviewed and educated  HEP               Other: BOLD is completed. MT techniques: FR to L quad; followed with passive stretching off EOB for quad and hip flexor- reports relief; then FR to ITB- GENTLE- sig TTP mid-ITB- approx. x8' total. - not today    Specific Instructions for next treatment:Consider EOB L ITB stretching next visit. Treatment Charges: Mins Units   []  Modalities     [x]  Ther Exercise 45 3   []  Manual Therapy     []  Ther Activities     []  Aquatics     []  Vasocompression     []  Other     Total Treatment time 45 3     [x6' on UBE]    Assessment: [x] Progressing toward goals. Added tband rows and extension to UE program today to strengthen postural muscles with cues to avoid UT compensation. Progressed LE program to include TG squats, 4 way hip, tband sidestepping, and SL hip abduction with good tolerance. Cues given to ensure proper technique of all exercises. Pt reports good tolerance to progressions. Issued updated HEP this date along with blue tbands. Will monitor response to session and progress per pt tolerance. [] No change. [x] Other:   [x] Patient would continue to benefit from skilled physical therapy services- in order to inc L cervical SB AROM, deep cervical flexor strength, L shoulder strength, L hip flexibility/strength for improved ADLs and QOL.      STG: (to be met in 6 treatments of L cervical spine and L hip next visit. See above.       Time In: 1:02 pm         Time Out: 2:00 pm    Electronically signed by:  Arnold Amor PTA

## 2020-08-11 ENCOUNTER — HOSPITAL ENCOUNTER (OUTPATIENT)
Dept: PHYSICAL THERAPY | Facility: CLINIC | Age: 55
Setting detail: THERAPIES SERIES
Discharge: HOME | End: 2020-08-11
Payer: COMMERCIAL

## 2020-08-11 PROCEDURE — 97110 THERAPEUTIC EXERCISES: CPT

## 2020-08-11 NOTE — FLOWSHEET NOTE
X20, 3\"   x   Supine L cerv rotation isometric x10, 5\"   HEP- reviewed   CUES OF ALL TYPES  -   Seated cervical rotation AROM X10, 5\" ea   HEP    Seated scapular retractions X10, 5\"   HEP    Seated cervical retractions  x10, 3\"   HEP    UT stretch  3x30\"   HEP    Levator Stretch   3x30\"   HEP     prayer stretch 3x30\"    3 way  x           Prone hip extension  X10, 5\" ea  HEP  B LEs    Supine bridge  x20, 5\"  HEP- reviewed  Added BTB at knees- 7/16/2020 -   Supine piriformis str. - IR- L 3x30\" ea  HEP    S/L CLAM x20 ea BTB B LES  HEP- reviewed without TB this date  CUES -   TG Single Leg Squats 2x10 L15  x   tband sidestepping 2L blue  x   SB wall squats 2x10   x   SL hip abduction 2x10   -          Lunge hip flexor str. Reviewed and educated  HEP               Other: BOLD is completed. MT techniques: FR to L quad; followed with passive stretching off EOB for quad and hip flexor- reports relief; then FR to ITB- GENTLE- sig TTP mid-ITB- approx. x8' total. - not today  DI to prox and distal L hip flexor    Specific Instructions for next treatment: Consider EOB L ITB stretching next visit. Treatment Charges: Mins Units   []  Modalities     [x]  Ther Exercise 45 3   [x]  Manual Therapy 5 0   []  Ther Activities     []  Aquatics     []  Vasocompression     []  Other     Total Treatment time 50 3     [x6' on UBE]    Assessment: [x] Progressing toward goals. Cont with therex above with good tolerance. Added prone ex and stability ball press downs to UE program for postural muscle strengthening. Cues for proper muscle activation and upright posture. Added unilateral TG squats and SB wall squats with some discomfort noted in the medial knees but decreased after cues for foot placement. Added distal and proximal L hip flexor release with relief of tension noted post. Pt reports pain is decreased post session. Will monitor repose to session and progress per tolerance. [] No change.      [x] Other:   [x] Patient would continue to benefit from skilled physical therapy services- in order to inc L cervical SB AROM, deep cervical flexor strength, L shoulder strength, L hip flexibility/strength for improved ADLs and QOL. STG: (to be met in 6 treatments)  1. ? Pain: Patient will report < 7/10 pain at rest and < 10/10 pain with active movement in order to improve QOL. 2. ? ROM: Will demo L cervical SB AROM to equal R and will report < 7/10 P with all other cervical AROM in order to improve visual scanning of the environment and OH gaze. Will also report < 7/10 P with OH reaching of L UE in order to improve functional reaching. 3. ? Strength: Will demo 4+/5 L shoulder gross strength with < 7/10 P in order to better match R and improve functional lifting. 4. ? Function: Will report < 5/10 P with light housework. 5. Patient to be independent with home exercise program as demonstrated by performance with correct form without cues. LTG: (to be met in 12 treatments)  1. Will report < 7/10 P with MOD housework. 2. Improve NDI to 30%    L hip goals- 7/1/2020:  1. Patient will report < 3/10 pain at rest and < 8/10 pain with active movement in order to improve QOL. 2. Will demo L hip flexor/quad flexibility to equal R and with dec complaints of \"tightness\" in order to improve functional mobility. 3. Will demo 4+/5 B hip IR/ER; hip ABD with dec hip flexor activation; and proper squat form in order to prevent future injuries. 4. Patient will report decreased TTP to L hip flexor and quad in order to indicate decreased inflammation and improved healing of injured site. 4. Improve LEFS to 10 points better     Pt. Education:  [x] Yes  [] No  [x] Reviewed Prior HEP/Ed  Method of Education: [x] Verbal  [] Demo  [x] Written  Comprehension of Education:  [x] Verbalizes understanding. [] Demonstrates understanding. [] Needs review. [] Demonstrates/verbalizes HEP/Ed previously given.   7/1/2020- All above in grid labeled as HEP issued for home- verbalized understanding to all. Issued new, updated HEP handout. Plan: [x] Continue current frequency toward long and short term goals. [x] Specific Instructions for subsequent treatments: Continue split treatments of L cervical spine and L hip next visit. See above.       Time In: 1:02 pm         Time Out: 2:00 pm    Electronically signed by:  Mac Cobos PTA

## 2020-08-18 ENCOUNTER — HOSPITAL ENCOUNTER (OUTPATIENT)
Dept: PHYSICAL THERAPY | Facility: CLINIC | Age: 55
Setting detail: THERAPIES SERIES
Discharge: HOME | End: 2020-08-18
Payer: COMMERCIAL

## 2020-08-18 PROCEDURE — 97110 THERAPEUTIC EXERCISES: CPT

## 2020-08-18 PROCEDURE — 97140 MANUAL THERAPY 1/> REGIONS: CPT

## 2020-08-18 NOTE — FLOWSHEET NOTE
[] [facility] [address]  [address]  [phone]  F: [phone] [] [address]  [facility] 36   [address]  P: [phone]  F: [phone] [x] [place]  [address] [facility] [address]  P: [phone]  F: [phone] [] [address]  [facility]   [address]   [place]: [phone]  F: [phone]      Physical Therapy Daily Treatment Note    Date:  2020  Patient Name:  Martha Read \"Alicia\"   :  1965  MRN: [id number]  Physician: Eileen Montoya              Insurance: Tre Ripple- 13 VISITS REMAINING  Medical Diagnosis: S16. 1XXA- strain of neck                     Rehab Codes: S16. 1XXA  Onset Date: 2015- flare-up                      Next 's appt.: TBD- pending success with PT services  Visit# / total visits:      Cancels/No Shows: 0    Subjective:    Pain:  [x] Yes  [] No Location: neck, between shoulder blades; L posterior/anterior hip- into quad   Pain Rating: (0-10 scale) 3/10  Pain altered Tx:  [x] No  [] Yes  Action: N/A  Comments: Pt reports pain in the L flank, hip, and neck. States she felt loosened up following last session.       Objective:  Modalities: HP, CP- PRN    Precautions: chronic L cervical spine pain into BEREKET and SCM; also chronic L hip pain; previous PT services without relief   Exercises: requests services at x1 week- HALF CERVICAL, HALF L HIP each session   Exercise Reps/ Time Weight/ Level Comments    Bike x6' L3 B UEs x   Wall-slide flexion X10, 5\"   -   Snow angels  X10, 5\"  At wall  For OH strengthening, neutral cervical spine  x   Supine SA punches 20x   -   tband rows 2x15 blue  x   tband ext 2x15 blue  x   Standing HAB 2x15 blue Denies inc pain  x   B ER w/ retraction 2x15 blue  x   Prone Bench - I,T,Y, row x15 ea   x   SB depressions X20, 3\"   x   Supine L cerv rotation isometric x10, 5\"   HEP- reviewed   CUES OF ALL TYPES  -   Seated cervical rotation AROM X10, 5\" ea   HEP    Seated scapular retractions X10, 5\"   HEP    Seated cervical retractions  x10, 3\"   HEP    UT stretch  3x30\"   HEP    Levator Stretch   3x30\"   HEP     prayer stretch 3x30\"    3 way             Prone hip extension  X10, 5\" ea  HEP  B LEs    Supine bridge  x20, 5\"  HEP- reviewed  Added BTB at knees- 7/16/2020 x   Supine piriformis str. - IR- L 3x30\" ea  HEP    S/L CLAM x20 ea BTB B LES  HEP- reviewed without TB this date  CUES x   TG Single Leg Squats 2x10 L15  x   tband sidestepping 2L blue  x   SB wall squats 2x10   x   Piriformis S 3x20\"   x   Sidelying IT band stretch 1'   x   SL hip abduction 2x10   x          Lunge hip flexor str. Reviewed and educated  HEP               Other: BOLD is completed. MT techniques: FR to L quad; followed with passive stretching off EOB for quad and hip flexor- reports relief; then FR to ITB- GENTLE- sig TTP mid-ITB- approx. x8' total. - not today  DI to L piriformis, glut med   to L IT band     Specific Instructions for next treatment: Consider EOB L ITB stretching next visit. Treatment Charges: Mins Units   []  Modalities     [x]  Ther Exercise 45 3   [x]  Manual Therapy 10 1   []  Ther Activities     []  Aquatics     []  Vasocompression     []  Other     Total Treatment time 55 4     [x6' on UBE]    Assessment: [x] Progressing toward goals. Progressed pt with warm up on upright bike today with both UE and LE movement. Cont with therex above with good tolerance. No new exercises today, focused on proper technique of current exercises. Moderate cues throughout to ensure proper muscle activation. Ended with  to L hamstring/IT band. Issued piriformis and IT band stretches for HEP. [] No change.      [x] Other:   [x] Patient would continue to benefit from skilled physical therapy services- in order to inc L Plan: [x] Continue current frequency toward long and short term goals. [x] Specific Instructions for subsequent treatments: Continue split treatments of L cervical spine and L hip next visit. See above.       Time In: 1:02 pm         Time Out: 2:03 pm    Electronically signed by:  Aisha Elise PTA

## 2020-08-25 ENCOUNTER — HOSPITAL ENCOUNTER (OUTPATIENT)
Dept: PHYSICAL THERAPY | Facility: CLINIC | Age: 55
Setting detail: THERAPIES SERIES
Discharge: HOME | End: 2020-08-25
Payer: COMMERCIAL

## 2020-08-25 PROCEDURE — 97110 THERAPEUTIC EXERCISES: CPT

## 2020-08-25 NOTE — FLOWSHEET NOTE
Objective:  Modalities: HP, CP- PRN    Precautions: chronic L cervical spine pain into BEREKET and SCM; also chronic L hip pain; previous PT services without relief   Exercises: requests services at x1 week- HALF CERVICAL, HALF L HIP each session   Exercise Reps/ Time Weight/ Level Comments    Bike x7' L0 B UEs x   Wall-slide flexion x10, 5\"  Reviewed- denies limitations  Performs with ease  x   Snow angels  x10, 5\"  At wall  For OH strengthening, pec stretching, neutral cervical spine   Denies limitations  Performs with ease x   Standing cervical retractions at wall 2x10, 5\" ea 1.5# Fair form  Limited secondary to pt talkative in nature x   Goal update, PT POC education   COMPLETED- 8/25/2020  Extensive  x   FR to distal L HS insertions- per pt request x5'  Reports tenderness, inc P, inc N/T into L foot x                 Supine SA punches 20x   -   tband rows 2x15 blue  x   tband ext 2x15 blue  x   Standing HAB 2x15 blue Denies inc pain  x   B ER w/ retraction 2x15 blue  x   Prone Bench - I,T,Y, row x15 ea   x   SB depressions x20, 3\"   x   Supine L cerv rotation isometric x10, 5\"   HEP- reviewed   CUES OF ALL TYPES  -   Seated cervical rotation AROM X10, 5\" ea   HEP    Seated scapular retractions X10, 5\"   HEP    Seated cervical retractions  x10, 3\"   HEP    UT stretch  3x30\"   HEP    Levator Stretch   3x30\"   HEP     prayer stretch 3x30\"    3 way             Prone hip extension  X10, 5\" ea  HEP  B LEs    Supine bridge  x20, 5\"  HEP- reviewed  Added BTB at knees- 7/16/2020 x   Supine piriformis str. - IR- L 3x30\" ea  HEP    S/L CLAM x20 ea BTB B LES  HEP- reviewed without TB this date  CUES x   TG Single Leg Squats 2x10 L15  x   tband sidestepping 2L blue  x   SB wall squats 2x10   x   Piriformis S 3x20\"   x   Sidelying IT band stretch 1'   x   SL hip abduction 2x10   x   Standing hip flexor str. With OH arm reach- B  2x30\" ea   x   Lunge hip flexor str.     Reviewed and educated  HEP               Other: BOLD is completed. See below for goal update. Unable to re-assess outcomes measures as pt talkative in nature  Progression through treatment session sig limited secondary to pt talkative in nature and emotionally labile- frustrated by lack of progress    MT techniques: FR to L quad; followed with passive stretching off EOB for quad and hip flexor- reports relief; then FR to ITB- GENTLE- sig TTP mid-ITB- approx. x8' total.- NOT TODAY    DI to L piriformis, glut med;  to L IT band- NOT TODAY- 8/25/2020- HELD FOR RE-ASSESSMENT     Specific Instructions for next treatment: Discharge today from PT services. Treatment Charges: Mins Units   []  Modalities     [x]  Ther Exercise 45 3   []  Manual Therapy     []  Ther Activities     []  Aquatics     []  Vasocompression     []  Other     Total Treatment time 45 3     [x7' on upright bike for both thoracic and lumbar mobility/stretching]    Assessment: [] Progressing toward goals. [x] No change. Pt presents with continued L cervical, thoracic, PSIS, HS, and QUAD pain. Reports temporary relief post each PT visit and with performing current HEP interventions, however, original pain still remains and recent new onset of R buttock and groin pain without specific cause. Therefore, performed goal update this date for discharge from PT services. Pt does not meet any therapy goal- pain continues to be high over this past week- limiting house work, driving. Pt also continues to demo L side weakness- limited d/t inc P. Pt continues to complain of \"tightness\"- which is not relieved with several stretching and strengthening interventions issued throughout PT services. Reports relief with recent MT techniques, however, no palpable tissue tension detected- hence why MT techniques were not performed at each visit.  Pt limited in visit count secondary to insurance limitations- will save x3 remaining visits for end of year in case of needing therapy for a new issue; or progression of current HEP interventions. Pt may benefit from further imaging to her L hip; or trial of acupuncture, dry needling for pain management. No further PT services recommended at this time. [] Other:   [] Patient would continue to benefit from skilled physical therapy services- in order to inc L cervical SB AROM, deep cervical flexor strength, L shoulder strength, L hip flexibility/strength for improved ADLs and QOL. STG: (to be met in 6 treatments)  1. ? Pain: Patient will report < 7/10 pain at rest and < 10/10 pain with active movement in order to improve QOL.- NOT MET- 6-8/10 pain over this past week; temporary relief with home stretches, changes in position   2. ? ROM: Will demo L cervical SB AROM to equal R and will report < 7/10 P with all other cervical AROM in order to improve visual scanning of the environment and OH gaze. Will also report < 7/10 P with OH reaching of L UE in order to improve functional reaching.- NOT MET- reports inc dizziness with checking blind spots to L while driving; 9/07 pain with L UE functional reaching   3. ? Strength: Will demo 4+/5 L shoulder gross strength with < 7/10 P in order to better match R and improve functional lifting.- NOT MET- 4/5 all- worse P with resisted FLEXION  4. ? Function: Will report < 5/10 P with light housework.- NOT MET  5. Patient to be independent with home exercise program as demonstrated by performance with correct form without cues. - UNCLEAR  LTG: (to be met in 12 treatments)  1. Will report < 7/10 P with MOD housework. - NO MET  2. Improve NDI to 30%- UNABLE TO RE-ASSESS    L hip goals- 7/1/2020:  1. Patient will report < 3/10 pain at rest and < 8/10 pain with active movement in order to improve QOL.- NOT MET  2.  Will demo L hip flexor/quad flexibility to equal R and with dec complaints of \"tightness\" in order to improve functional mobility.- NOT MET- continued \"tightness\"   3. Will demo 4+/5 B hip IR/ER; hip ABD with dec hip flexor activation; and proper squat form in order to prevent future injuries.- NOT MET   4. Patient will report decreased TTP to L hip flexor and quad in order to indicate decreased inflammation and improved healing of injured site.- NOT MET  4. Improve LEFS to 10 points better- UNABLE TO RE-ASSESS     Pt. Education:  [x] Yes  [] No  [] Reviewed Prior HEP/Ed  Method of Education: [x] Verbal  [] Demo  [] Written  Comprehension of Education:  [x] Verbalizes understanding. [] Demonstrates understanding. [] Needs review. [] Demonstrates/verbalizes HEP/Ed previously given. 7/1/2020- All above in grid labeled as HEP issued for home- verbalized understanding to all. Issued new, updated HEP handout. 8/25/2020-      Plan: [x] Continue current frequency toward long and short term goals. [x] Specific Instructions for subsequent treatments: Discharge today from PT services.       Time In: 10:06AM        Time Out: 10:55AM    Electronically signed by:  Luciano Zazueta, PT

## 2020-08-25 NOTE — DISCHARGE SUMMARY
[] [facility] [facility]  [address]  [phone]  F: [phone] [] [address] [facility] [address]   [address]  P: [phone]  F: [phone] [x] [place]  [address] [facility]  P: [phone]  F: [phone] [] [address]  [facility]   [address]   [place]: [phone]  F: [phone]      Physical Therapy Discharge Note    Date: 2020      Patient: Ashley Castellon  : 1965  MRN: [id number]    Mathew Marcano MD              Insurance: [place] Diagnosis: S16. 1XXA- strain of neck                     Rehab Codes: S16. 1XXA  Onset Date: 2015- flare-up                      Next 's appt.: TBD- pending success with PT services  Visit# / total visits: 10/12                                Cancels/No Shows: 0  Date of initial visit: 2020                Date of final visit: 2020    Subjective:    Pain:  [x]? Yes  []? No   Location: L cervical, thoracic, PSIS, HS, QUAD          Pain Rating: (0-10 scale) unable to retrieve numerical rating this date/10  Pain altered Tx:  [x]? No  []? Yes  Action: N/A  Comments: Reports greatest relief with past x3 treatment sessions- which included MT techniques. However, reports, \"it's gonna take time with the exercises because it took time to get like this. \" Pt emotionally labile when providing subjective report, as well as throughout treatment session. Reports most significant pain within L HS today, and new onset of R buttock and groin pain- but without specific cause. Despite reporting relief post each treatment session, reports worse pain overall with PT services thus far.  Reports plans to schedule follow-up visit with referring MD- unable as of recent secondary to caring for family members. Agreeable to discharge from PT services today.      x5' late to treatment appointment. Assessment:  Pt presents with continued L cervical, thoracic, PSIS, HS, and QUAD pain. Reports temporary relief post each PT visit and with performing current HEP interventions, however, original pain still remains and recent new onset of R buttock and groin pain without specific cause. Therefore, performed goal update this date for discharge from PT services. Pt does not meet any therapy goal- pain continues to be high over this past week- limiting house work, driving. Pt also continues to demo L side weakness- limited d/t inc P. Pt continues to complain of \"tightness\"- which is not relieved with several stretching and strengthening interventions issued throughout PT services. Reports relief with recent MT techniques, however, no palpable tissue tension detected- hence why MT techniques were not performed at each visit. Pt limited in visit count secondary to insurance limitations- will save x3 remaining visits for end of year in case of needing therapy for a new issue; or progression of current HEP interventions. Pt may benefit from further imaging to her L hip; or trial of acupuncture, dry needling for pain management. No further PT services recommended at this time. STG: (to be met in 6 treatments)  1. ? Pain: Patient will report < 7/10 pain at rest and < 10/10 pain with active movement in order to improve QOL.- NOT MET- 6-8/10 pain over this past week; temporary relief with home stretches, changes in position   2. ? ROM: Will demo L cervical SB AROM to equal R and will report < 7/10 P with all other cervical AROM in order to improve visual scanning of the environment and OH gaze. Will also report < 7/10 P with OH reaching of L UE in order to improve functional reaching.- NOT MET- reports inc dizziness with checking blind spots to L while driving; 6/51 pain with L UE functional reaching   3. ? Strength:  Will demo 4+/5 L shoulder gross strength with < 7/10 P in order to better match R and improve functional lifting.- NOT MET- 4/5 all- worse P with resisted FLEXION  4. ? Function: Will report < 5/10 P with light housework.- NOT MET  5. Patient to be independent with home exercise program as demonstrated by performance with correct form without cues. - UNCLEAR  LTG: (to be met in 12 treatments)  1. Will report < 7/10 P with MOD housework. - NO MET  2. Improve NDI to 30%- UNABLE TO RE-ASSESS     L hip goals- 7/1/2020:  1. Patient will report < 3/10 pain at rest and < 8/10 pain with active movement in order to improve QOL.- NOT MET  2. Will demo L hip flexor/quad flexibility to equal R and with dec complaints of \"tightness\" in order to improve functional mobility.- NOT MET- continued \"tightness\"   3. Will demo 4+/5 B hip IR/ER; hip ABD with dec hip flexor activation; and proper squat form in order to prevent future injuries.- NOT MET   4. Patient will report decreased TTP to L hip flexor and quad in order to indicate decreased inflammation and improved healing of injured site.- NOT MET  4. Improve LEFS to 10 points better- UNABLE TO RE-ASSESS    Treatment to Date:  [x] Therapeutic Exercise    [] Modalities:  [] Therapeutic Activity    [] Ultrasound  [] Electrical Stimulation  [] Gait Training     [] Massage       [] Lumbar/Cervical Traction  [] Neuromuscular Re-education [] Cold/hotpack [] Iontophoresis: 4 mg/mL  [x] Instruction in Home Exercise Program                     Dexamethasone Sodium  [x] Manual Therapy             Phosphate 40-80 mAmin  [] Aquatic Therapy                   [] Vasocompression/    [] Other:             Game Ready    Discharge Status:     [] Pt recovered from conditions. Treatment goals were met. [] Pt received maximum benefit. No further therapy indicated at this time. [] Pt to continue exercise/home instructions independently.     [] Therapy interrupted due to:    [] Pt has 2 or more no shows/cancels, is discontinued per our policy. [] Pt has completed prescribed number of treatment sessions. [x] Other: see above. Electronically signed by Lieutenant Andres PT on 8/25/2020 at 12:09 PM    If you have any questions or concerns, please don't hesitate to call.   Thank you for your referral.

## 2020-09-04 ENCOUNTER — TELEPHONE (OUTPATIENT)
Dept: FAMILY MEDICINE CLINIC | Age: 55
End: 2020-09-04

## 2020-09-04 ENCOUNTER — VIRTUAL VISIT (OUTPATIENT)
Dept: FAMILY MEDICINE CLINIC | Age: 55
End: 2020-09-04
Payer: COMMERCIAL

## 2020-09-04 PROCEDURE — 99442 PR PHYS/QHP TELEPHONE EVALUATION 11-20 MIN: CPT | Performed by: FAMILY MEDICINE

## 2020-09-04 ASSESSMENT — PATIENT HEALTH QUESTIONNAIRE - PHQ9
2. FEELING DOWN, DEPRESSED OR HOPELESS: 0
1. LITTLE INTEREST OR PLEASURE IN DOING THINGS: 0
SUM OF ALL RESPONSES TO PHQ QUESTIONS 1-9: 0
SUM OF ALL RESPONSES TO PHQ9 QUESTIONS 1 & 2: 0
SUM OF ALL RESPONSES TO PHQ QUESTIONS 1-9: 0

## 2020-09-04 NOTE — PATIENT INSTRUCTIONS
Patient Education        Preventing Osteoporosis: Care Instructions  Your Care Instructions     Osteoporosis means the bones are weak and thin enough that they can break easily. The older you are, the more likely you are to get osteoporosis. But with plenty of calcium, vitamin D, and exercise, you can help prevent osteoporosis. The preteen and teen years are a key time for bone building. With the help of calcium, vitamin D, and exercise in those early years and beyond, the bones reach their peak density and strength by age 27. After age 27, your bones naturally start to thin and weaken. The stronger your bones are at around age 27, the lower your risk for osteoporosis. But no matter what your age and risk are, your bones still need calcium, vitamin D, and exercise to stay strong. Also avoid smoking, and limit alcohol. Smoking and heavy alcohol use can make your bones thinner. Talk to your doctor about any special risks you might have, such as having a close relative with osteoporosis or taking a medicine that can weaken bones. Your doctor can tell you the best ways to protect your bones from thinning. Follow-up care is a key part of your treatment and safety. Be sure to make and go to all appointments, and call your doctor if you are having problems. It's also a good idea to know your test results and keep a list of the medicines you take. How can you care for yourself at home? · Get enough calcium and vitamin D. The [employer] of Medicine recommends adults younger than age 46 need 1,000 mg of calcium and 600 IU of vitamin D each day. Women ages 46 to 79 need 1,200 mg of calcium and 600 IU of vitamin D each day. Men ages 46 to 79 need 1,000 mg of calcium and 600 IU of vitamin D each day. Adults 71 and older need 1,200 mg of calcium and 800 IU of vitamin D each day. ? Eat foods rich in calcium, like yogurt, cheese, milk, and dark green vegetables.   ? Eat foods rich in vitamin D, like eggs, fatty fish, cereal, and fortified milk. ? Get some sunshine. Your body uses sunshine to make its own vitamin D. The safest time to be out in the sun is before 10 a.m. or after 3 p.m. Avoid getting sunburned. Sunburn can increase your risk of skin cancer. ? Talk to your doctor about taking a calcium plus vitamin D supplement if you don't think you are getting enough through your diet and sunshine. Ask about what type of calcium is right for you, and how much to take at a time. Adults ages 23 to 48 should not get more than 2,500 mg of calcium and 4,000 IU of vitamin D each day, whether it is from supplements and/or food. Adults ages 46 and older should not get more than 2,000 mg of calcium and 4,000 IU of vitamin D each day from supplements and/or food. · Get regular bone-building exercise. Weight-bearing and resistance exercises keep bones healthy by working the muscles and bones against gravity. Start out at an exercise level that feels right for you. Add a little at a time until you can do the following:  ? Do 30 minutes of weight-bearing exercise on most days of the week. Walking, jogging, stair climbing, and dancing are good choices. ? Do resistance exercises with weights or elastic bands 2 to 3 days a week. · Limit alcohol. Drink no more than 1 alcohol drink a day if you are a woman. Drink no more than 2 alcohol drinks a day if you are a man. · Do not smoke. Smoking can make bones thin faster. If you need help quitting, talk to your doctor about stop-smoking programs and medicines. These can increase your chances of quitting for good. When should you call for help? Watch closely for changes in your health, and be sure to contact your doctor if you have any problems. Where can you learn more? Go to https://[employer]. org and sign in to your [employer]ict[employer] account. Enter V648 in the Anyone Home box to learn more about \"Preventing Osteoporosis: Care Instructions. \"     If you do not have an account, please click on the \"Sign Up Now\" link. Current as of: August 7, 2019               Content Version: 12.5  © 3520-3151 Healthwise, Incorporated. Care instructions adapted under license by [facility] ([facility]). If you have questions about a medical condition or this instruction, always ask your healthcare professional. [address] any warranty or liability for your use of this information.

## 2020-09-04 NOTE — PROGRESS NOTES
General FM note    Keri Hopper is a 49 y.o. female who presents today for follow up on her  medical conditions as noted below. Keri Hopper is c/o of No chief complaint on file.       Patient Active Problem List:     Cyst of skin and subcutaneous tissue     Neoplasm of uncertain behavior of skin     Abdominal pain     Cystocele     Rectocele     Inflamed epidermoid cyst of skin     Fibroid     H/O LEEP     Past Medical History:   Diagnosis Date    Abnormal Pap smear of cervix     Anxiety     Arthritis     NECK    Dental crowns present     UPPER ;AND LOWER    Depression     Difficult intravenous access     AVIOID RT HAND    Dizziness     R/T H-PYLORI    Dysphasia     states has cleared up    Ganglion cyst     LT WRIST. MID BACK    H pylori ulcer 2016    ABD-TREATED WITH ATB FEELING BETTER    HPV in female     Hypothyroidism     Nausea     R/T H PYLORI    Osteoarthritis     Rectal prolapse     SAB (spontaneous )     Shortness of breath     AT TIMES    Wears glasses     Wears glasses       Past Surgical History:   Procedure Laterality Date    APPENDECTOMY      COLONOSCOPY  2016    PT STATES NORMAL    CYST REMOVAL      LT WRIST GANGLION CYSTECTOMY    ENDOMETRIAL ABLATION      OTHER SURGICAL HISTORY  2015    EXCISION LESIONS MID BACK X2    OTHER SURGICAL HISTORY      pre cancer cells removed from cervix    OTHER SURGICAL HISTORY  10/31/2016    EXCISION SCALP LEISION X3    TUBAL LIGATION  1992    UPPER GASTROINTESTINAL ENDOSCOPY  2016    with biopsy    URETHRAL SURGERY N/A 2020    PUDENDAL BLOCK TRIGGER POINT WITH EXPERAL AND KENALOG performed by Bridgett Mehta DO at [address] [place] [address]  16    vag A/P repair with cysto    VAGINA SURGERY  2020    PUDENDAL BLOCK TRIGGER POINT WITH EXPERAL AND KENALOG (N/A )     Family History   Problem Relation Age of Onset    Diabetes Mother     Hypertension Mother     Cancer Mother         KIDNEY    Other Mother         MENTAL HEALTH DISORDER    Arthritis Father         HIP REPLACEMENT    Other Sister         MENTAL DISORDER    Other Sister         FIBROMYALGIA, MENTAL HEALTH ISSUES    Arthritis Sister      Current Outpatient Medications   Medication Sig Dispense Refill    tiZANidine (ZANAFLEX) 4 MG tablet Take 1 tablet by mouth 3 times daily as needed (muscle strain) 30 tablet 0    Methylsulfonylmethane POWD by Does not apply route      Turmeric POWD by Does not apply route      ergocalciferol (ERGOCALCIFEROL) 1.25 MG (61427 UT) capsule Take 1 capsule by mouth once a week 4 capsule 3    zoster recombinant adjuvanted vaccine (SHINGRIX) 50 MCG/0.5ML SUSR injection Inject 0.5 mLs into the muscle See Admin Instructions 1 dose now and repeat in 2-6 months 0.5 mL 1    ibuprofen (ADVIL;MOTRIN) 600 MG tablet Take 1 tablet by mouth every 6 hours as needed for Pain (Patient not taking: Reported on 6/15/2020) 30 tablet 1    Multiple Vitamins-Minerals (THERAPEUTIC MULTIVITAMIN-MINERALS) tablet Take 1 tablet by mouth daily      IODINE EX       Turmeric 450 MG CAPS Take by mouth       No current facility-administered medications for this visit. ALLERGIES:    Allergies   Allergen Reactions    Biaxin [Clarithromycin] Hives    Minocycline Hives    Prozac [Fluoxetine Hcl] Hives       Social History     Tobacco Use    Smoking status: Never Smoker    Smokeless tobacco: Never Used   Substance Use Topics    Alcohol use: Yes     Alcohol/week: 1.0 standard drinks     Types: 1 Standard drinks or equivalent per week     Comment: BEER, WINE OR MIXED DRINKS 4 DRINKS A MONTH      There is no height or weight on file to calculate BMI. LMP 10/30/2016 Comment: negative. Subjective:      HPI    48 yo female reaching out today per phone visit. Patient states that she has follows up with physical therapy that the pain at her neck at her left hip area is not improved at all.   She states now Vitals/Constitutional/EENT/Resp/CV/GI//MS/Neuro/Skin/Heme-Lymph-Imm. Pursuant to the emergency declaration under the [address] [place] [address] and the [employer] and [employer] Act, this Virtual Visit was conducted with patient's (and/or legal guardian's) consent, to reduce the patient's risk of exposure to COVID-19 and provide necessary medical care. The patient (and/or legal guardian) has also been advised to contact this office for worsening conditions or problems, and seek emergency medical treatment and/or call 911 if deemed necessary. Patient identification was verified at the start of the visit: Yes    Total time spent for this encounter: 15.48 minutes    Services were provided through a video synchronous discussion virtually to substitute for in-person clinic visit. Patient and provider were located at their individual homes. --Chantelle Holbrook MD on 9/4/2020 at 9:45 AM    An electronic signature was used to authenticate this note. No follow-ups on file. Orders Placed This Encounter   Procedures   Rg Gary MD, Physical Medicine and Rehabilitation, [place]     Referral Priority:   Routine     Referral Type:   Eval and Treat     Referral Reason:   Specialty Services Required     Referred to Provider:   Felisha Cortes MD     Requested Specialty:   Physical Medicine and Rehab     Number of Visits Requested:   1     No orders of the defined types were placed in this encounter.     (Please note that portions of this note were completed with a voice recognition program. Efforts were made to edit the dictations but occasionally words are mis-transcribed.)

## 2020-09-04 NOTE — TELEPHONE ENCOUNTER
The patient tells me that she did send in a Cologuard. Please make sure we will get the results back. Thank you.

## 2020-09-14 ENCOUNTER — OFFICE VISIT (OUTPATIENT)
Dept: FAMILY MEDICINE CLINIC | Age: 55
End: 2020-09-14
Payer: COMMERCIAL

## 2020-09-14 ENCOUNTER — TELEPHONE (OUTPATIENT)
Dept: FAMILY MEDICINE CLINIC | Age: 55
End: 2020-09-14

## 2020-09-14 VITALS
BODY MASS INDEX: 30.45 KG/M2 | HEART RATE: 68 BPM | OXYGEN SATURATION: 99 % | SYSTOLIC BLOOD PRESSURE: 109 MMHG | WEIGHT: 183 LBS | DIASTOLIC BLOOD PRESSURE: 66 MMHG | TEMPERATURE: 97.1 F

## 2020-09-14 PROCEDURE — 1036F TOBACCO NON-USER: CPT | Performed by: FAMILY MEDICINE

## 2020-09-14 PROCEDURE — G8427 DOCREV CUR MEDS BY ELIG CLIN: HCPCS | Performed by: FAMILY MEDICINE

## 2020-09-14 PROCEDURE — 90686 IIV4 VACC NO PRSV 0.5 ML IM: CPT | Performed by: FAMILY MEDICINE

## 2020-09-14 PROCEDURE — 90471 IMMUNIZATION ADMIN: CPT | Performed by: FAMILY MEDICINE

## 2020-09-14 PROCEDURE — G8417 CALC BMI ABV UP PARAM F/U: HCPCS | Performed by: FAMILY MEDICINE

## 2020-09-14 PROCEDURE — 3017F COLORECTAL CA SCREEN DOC REV: CPT | Performed by: FAMILY MEDICINE

## 2020-09-14 PROCEDURE — 99214 OFFICE O/P EST MOD 30 MIN: CPT | Performed by: FAMILY MEDICINE

## 2020-09-14 RX ORDER — FAMOTIDINE 20 MG/1
20 TABLET, FILM COATED ORAL 2 TIMES DAILY
Qty: 60 TABLET | Refills: 5 | Status: SHIPPED | OUTPATIENT
Start: 2020-09-14

## 2020-09-14 RX ORDER — GABAPENTIN 100 MG/1
100 CAPSULE ORAL 2 TIMES DAILY
Qty: 60 CAPSULE | Refills: 2 | Status: SHIPPED | OUTPATIENT
Start: 2020-09-14 | End: 2020-12-22

## 2020-09-14 SDOH — ECONOMIC STABILITY: FOOD INSECURITY: WITHIN THE PAST 12 MONTHS, YOU WORRIED THAT YOUR FOOD WOULD RUN OUT BEFORE YOU GOT MONEY TO BUY MORE.: NEVER TRUE

## 2020-09-14 SDOH — ECONOMIC STABILITY: INCOME INSECURITY: HOW HARD IS IT FOR YOU TO PAY FOR THE VERY BASICS LIKE FOOD, HOUSING, MEDICAL CARE, AND HEATING?: NOT HARD AT ALL

## 2020-09-14 SDOH — ECONOMIC STABILITY: FOOD INSECURITY: WITHIN THE PAST 12 MONTHS, THE FOOD YOU BOUGHT JUST DIDN'T LAST AND YOU DIDN'T HAVE MONEY TO GET MORE.: NEVER TRUE

## 2020-09-14 NOTE — PROGRESS NOTES
General FM note    Bob Strong is a 49 y.o. female who presents today for follow up on her  medical conditions as noted below.   Bob Strong is c/o of   Chief Complaint   Patient presents with   Luis Eduardo Ray     left       Patient Active Problem List:     Cyst of skin and subcutaneous tissue     Neoplasm of uncertain behavior of skin     Abdominal pain     Cystocele     Rectocele     Inflamed epidermoid cyst of skin     Fibroid     H/O LEEP     Past Medical History:   Diagnosis Date    Abnormal Pap smear of cervix     Anxiety     Arthritis     NECK    Dental crowns present     UPPER ;AND LOWER    Depression     Difficult intravenous access     AVIOID RT HAND    Dizziness     R/T H-PYLORI    Dysphasia     states has cleared up    Ganglion cyst     LT WRIST. MID BACK    H pylori ulcer 2016    ABD-TREATED WITH ATB FEELING BETTER    HPV in female     Hypothyroidism     Nausea     R/T H PYLORI    Osteoarthritis     Rectal prolapse     SAB (spontaneous )     Shortness of breath     AT TIMES    Wears glasses     Wears glasses       Past Surgical History:   Procedure Laterality Date    APPENDECTOMY      COLONOSCOPY  2016    PT STATES NORMAL    CYST REMOVAL      LT WRIST GANGLION CYSTECTOMY    ENDOMETRIAL ABLATION      OTHER SURGICAL HISTORY  2015    EXCISION LESIONS MID BACK X2    OTHER SURGICAL HISTORY      pre cancer cells removed from cervix    OTHER SURGICAL HISTORY  10/31/2016    EXCISION SCALP LEISION X3    TUBAL LIGATION  1992    UPPER GASTROINTESTINAL ENDOSCOPY  2016    with biopsy    URETHRAL SURGERY N/A 2020    PUDENDAL BLOCK TRIGGER POINT WITH EXPERAL AND KENALOG performed by Fazal Diana DO at [address] [place] [address]  16    vag A/P repair with cysto    VAGINA SURGERY  2020    PUDENDAL BLOCK TRIGGER POINT WITH EXPERAL AND KENALOG (N/A )     Family History   Problem Relation Age of Onset    Diabetes Mother  Hypertension Mother     Cancer Mother         KIDNEY    Other Mother         MENTAL HEALTH DISORDER    Arthritis Father         HIP REPLACEMENT    Other Sister         MENTAL DISORDER    Other Sister         FIBROMYALGIA, MENTAL HEALTH ISSUES    Arthritis Sister      Current Outpatient Medications   Medication Sig Dispense Refill    famotidine (PEPCID) 20 MG tablet Take 1 tablet by mouth 2 times daily 60 tablet 5    ciprofloxacin-dexamethasone (CIPRODEX) 0.3-0.1 % otic suspension Place 4 drops into both ears 2 times daily for 7 days 1 Bottle 0    gabapentin (NEURONTIN) 100 MG capsule Take 1 capsule by mouth 2 times daily for 30 days. Intended supply: 30 days 60 capsule 2    Turmeric POWD by Does not apply route      IODINE EX       zoster recombinant adjuvanted vaccine (SHINGRIX) 50 MCG/0.5ML SUSR injection Inject 0.5 mLs into the muscle See Admin Instructions 1 dose now and repeat in 2-6 months 0.5 mL 1     No current facility-administered medications for this visit. ALLERGIES:    Allergies   Allergen Reactions    Biaxin [Clarithromycin] Hives    Minocycline Hives    Prozac [Fluoxetine Hcl] Hives       Social History     Tobacco Use    Smoking status: Never Smoker    Smokeless tobacco: Never Used   Substance Use Topics    Alcohol use: Yes     Alcohol/week: 1.0 standard drinks     Types: 1 Standard drinks or equivalent per week     Comment: BEER, WINE OR MIXED DRINKS 4 DRINKS A MONTH      Body mass index is 30.45 kg/m². /66   Pulse 68   Temp 97.1 °F (36.2 °C)   Wt 183 lb (83 kg)   LMP 10/30/2016 Comment: negative. SpO2 99%   BMI 30.45 kg/m²     Subjective:      HPI    48 yo female coming in today with multiple complaints. Dizziness: Patient complains of a 3 day history of vertigo - patient feels like room is spinning. Symptoms are intermittent, occuring several times per day, and lasting a few seconds per episode. Overall, the symptoms have been unchanged over time. Symptoms are exacerbated by rapid head movements. Associated symptoms:  otalgia- left ear. She denies any other symptoms. Relevant PMH: no pertinent PMH. Recent medication changes:  none. She has been treated with nothing. Previous work up:  none. Patient also tells me that she still has this discomfort pain at her left hip. That she has discomfort and pain in her back areas. She has been seeing physical therapy and it has helped but she states she has very nonspecific pains. And she has discomfort in her genital area where she just sometimes just has to get up she cannot sit down. But she has been doing well with her weight. She lost multiple pounds. She has been exercising at least trying to eating a healthy diet. Review of Systems   Constitutional: Negative for fever and unexpected weight change. Respiratory: Negative for cough and shortness of breath. Cardiovascular: Negative for chest pain and leg swelling. Gastrointestinal: Negative for diarrhea, constipation and blood in stool. Musculoskeletal: Negative for back pain and gait problem. Positive for left hip comfort and overall discomfort. Positive for discomfort in her genital area with sitting. Skin: Negative for color change and rash. Neurological: Negative for headaches. Positive for dizziness. Psychiatric/Behavioral: Negative for confusion and agitation. Objective:   Physical Exam  Constitutional: VS (see above). General appearance: normal development, habitus and attention, no deformities. No distress. Eyes: normal conjunctiva and lids. TM: Right is somewhat swollen and irritated very slightly left of normal limits. CAV: RRR, no RMG. No edema lower extremities. Pulmo: CTA bilateral, no CWR. Skin: no rashes, lesions or ulcers. Musculoskeletal: normal gait. Nails: no clubbing or cyanosis. Psychiatric: alert and oriented to place, time and person. Normal mood and affect.     Assessment:       Diagnosis Orders 1. Benign paroxysmal positional vertigo, unspecified laterality     2. Need for influenza vaccination  INFLUENZA, QUADV, 3 YRS AND OLDER, IM PF, PREFILL SYR OR SDV, 0.5ML (AFLURIA QUADV, PF)   3. Tendinitis of left hip  Rigoberto Metzger MD, [facility]       Plan:   I did give the patient exercises for this condition. Also eardrops called in. Patient did receive a flu vaccination. See a dental rehab specialist for further recommendation. Call or return to clinic prn if these symptoms worsen or fail to improve as anticipated. I have reviewed the instructions with the patient, answering all questions to her satisfaction. Return in about 3 months (around 12/14/2020), or if symptoms worsen or fail to improve. Orders Placed This Encounter   Procedures    INFLUENZA, QUADV, 3 YRS AND OLDER, IM PF, PREFILL SYR OR SDV, 0.5ML (Kathalene Lizbeth, PF)   Rigoberto Metzger MD, Physical Medicine and Rehabilitation, [place]     Referral Priority:   Routine     Referral Type:   Eval and Treat     Referral Reason:   Specialty Services Required     Referred to Provider:   Qian Stanton MD     Requested Specialty:   Physical Medicine and Rehab     Number of Visits Requested:   1     Orders Placed This Encounter   Medications    famotidine (PEPCID) 20 MG tablet     Sig: Take 1 tablet by mouth 2 times daily     Dispense:  60 tablet     Refill:  5    ciprofloxacin-dexamethasone (CIPRODEX) 0.3-0.1 % otic suspension     Sig: Place 4 drops into both ears 2 times daily for 7 days     Dispense:  1 Bottle     Refill:  0    gabapentin (NEURONTIN) 100 MG capsule     Sig: Take 1 capsule by mouth 2 times daily for 30 days. Intended supply: 30 days     Dispense:  60 capsule     Refill:  2       Jarrett Morejon received counseling on the following healthy behaviors: nutrition, exercise and medication adherence  Reviewed prior labs and health maintenance.   Continue current medications, diet and exercise. Discussed use, benefit, and side effects of prescribed medications. Barriers to medication compliance addressed. Patient given educational materials - see patient instructions. All patient questions answered. Patient voiced understanding.       Electronically signed by Janace Epley, MD on 9/15/2020 at 6:19 AM       (Please note that portions of this note were completed with a voice recognition program. Efforts were made to edit the dictations but occasionally words are mis-transcribed.)

## 2020-09-14 NOTE — TELEPHONE ENCOUNTER
If she has any fevers sore throats achiness taste changes not able to smell she needs to go to flu clinic. Otherwise please put on the schedule. Thank you.

## 2020-09-17 ENCOUNTER — TELEPHONE (OUTPATIENT)
Dept: FAMILY MEDICINE CLINIC | Age: 55
End: 2020-09-17

## 2020-09-17 RX ORDER — OFLOXACIN 3 MG/ML
2 SOLUTION/ DROPS OPHTHALMIC 4 TIMES DAILY
Qty: 10 ML | Refills: 0 | Status: SHIPPED | OUTPATIENT
Start: 2020-09-17 | End: 2020-09-27

## 2020-09-17 NOTE — TELEPHONE ENCOUNTER
[facility] states Ciprodex is not covered by pt insurance but oflax or neopoly HC   Ofloxacin drops or neopoly HC drops.

## 2020-11-05 ENCOUNTER — INITIAL CONSULT (OUTPATIENT)
Dept: PHYSICAL MEDICINE AND REHAB | Age: 55
End: 2020-11-05
Payer: COMMERCIAL

## 2020-11-05 VITALS
HEIGHT: 66 IN | BODY MASS INDEX: 29.41 KG/M2 | HEART RATE: 64 BPM | TEMPERATURE: 98.4 F | WEIGHT: 183 LBS | DIASTOLIC BLOOD PRESSURE: 75 MMHG | SYSTOLIC BLOOD PRESSURE: 137 MMHG

## 2020-11-05 PROCEDURE — G8482 FLU IMMUNIZE ORDER/ADMIN: HCPCS | Performed by: PHYSICAL MEDICINE & REHABILITATION

## 2020-11-05 PROCEDURE — 3017F COLORECTAL CA SCREEN DOC REV: CPT | Performed by: PHYSICAL MEDICINE & REHABILITATION

## 2020-11-05 PROCEDURE — 99204 OFFICE O/P NEW MOD 45 MIN: CPT | Performed by: PHYSICAL MEDICINE & REHABILITATION

## 2020-11-05 PROCEDURE — 20553 NJX 1/MLT TRIGGER POINTS 3/>: CPT | Performed by: PHYSICAL MEDICINE & REHABILITATION

## 2020-11-05 PROCEDURE — 1036F TOBACCO NON-USER: CPT | Performed by: PHYSICAL MEDICINE & REHABILITATION

## 2020-11-05 PROCEDURE — G8417 CALC BMI ABV UP PARAM F/U: HCPCS | Performed by: PHYSICAL MEDICINE & REHABILITATION

## 2020-11-05 PROCEDURE — G8427 DOCREV CUR MEDS BY ELIG CLIN: HCPCS | Performed by: PHYSICAL MEDICINE & REHABILITATION

## 2020-11-05 RX ORDER — LIDOCAINE HYDROCHLORIDE 10 MG/ML
6 INJECTION, SOLUTION INFILTRATION; PERINEURAL ONCE
Status: COMPLETED | OUTPATIENT
Start: 2020-11-05 | End: 2020-11-05

## 2020-11-05 RX ADMIN — LIDOCAINE HYDROCHLORIDE 6 ML: 10 INJECTION, SOLUTION INFILTRATION; PERINEURAL at 11:26

## 2020-11-05 NOTE — PATIENT INSTRUCTIONS
Patient Education        Iliotibial Band Syndrome: Exercises  Introduction  Here are some examples of exercises for you to try. The exercises may be suggested for a condition or for rehabilitation. Start each exercise slowly. Ease off the exercises if you start to have pain. You will be told when to start these exercises and which ones will work best for you. How to do the exercises  Iliotibial band stretch   1. Lean sideways against a wall. If you are not steady on your feet, hold on to a chair or counter. 2. Stand on the leg with the affected hip, with that leg close to the wall. Then cross your other leg in front of it. 3. Let your affected hip drop out to the side of your body and against the wall. Then lean away from your affected hip until you feel a stretch. 4. Hold the stretch for 15 to 30 seconds. 5. Repeat 2 to 4 times. Piriformis stretch   1. Lie on your back with your legs straight. 2. Lift your affected leg and bend your knee. With your opposite hand, reach across your body, and then gently pull your knee toward your opposite shoulder. 3. Hold the stretch for 15 to 30 seconds. 4. Repeat 2 to 4 times. Hamstring wall stretch   1. Lie on your back in a doorway, with your good leg through the open door. 2. Slide your affected leg up the wall to straighten your knee. You should feel a gentle stretch down the back of your leg. 3. Hold the stretch for at least 1 minute to begin. Then try to lengthen the time you hold the stretch to as long as 6 minutes. 4. Repeat 2 to 4 times. 5. If you do not have a place to do this exercise in a doorway, there is another way to do it:  6. Lie on your back, and bend the knee of your affected leg. 7. Loop a towel under the ball and toes of that foot, and hold the ends of the towel in your hands. 8. Straighten your knee, and slowly pull back on the towel. You should feel a gentle stretch down the back of your leg.   9. Hold the stretch for 15 to 30 seconds. Or even better, hold the stretch for 1 minute if you can. 10. Repeat 2 to 4 times. 1. Do not arch your back. 2. Do not bend either knee. 3. Keep one heel touching the floor and the other heel touching the wall. Do not point your toes. Follow-up care is a key part of your treatment and safety. Be sure to make and go to all appointments, and call your doctor if you are having problems. It's also a good idea to know your test results and keep a list of the medicines you take. Where can you learn more? Go to [email or web address] org and sign in to your [employer] account. Enter P252 in the Lift box to learn more about \"Iliotibial Band Syndrome: Exercises. \"     If you do not have an account, please click on the \"Sign Up Now\" link. Current as of: March 2, 2020               Content Version: 12.6  © 2006-2020 [employer]. Care instructions adapted under license by [facility] ([facility]). If you have questions about a medical condition or this instruction, always ask your healthcare professional. [place] [address] any warranty or liability for your use of this information.

## 2020-11-05 NOTE — PROGRESS NOTES
[facility]  [place] [facility]  [address] [facility]  Dept: [phone]  Dept Fax: [phone]    New Patient ConsultationNote    Pam Quiñonez, 49 y.o., female, is c/o of Neck Pain and Hip Pain   and request for evaluation of multiple areas of pain by Nakia Reece MD.    HPI:     HPI  Patient presents today with multiple c/o pain in neck, between shoulder blades, L low back, pelvic area, and L IT band. She has medical history of workers comp claim for [place]" in her lumbar spine. She also had EMG by Dr. Ngoc Angulo in 2007 for L C6 radiculopathy. She had surgical correction of rectocele and pelvic floor weakness in 2016 with persistent c/o \"tightness and pain\" in perineal area and L medial thigh/groin which continues despite pelvic floor therapy being completed in 2017. For her musculoskeletal complaints in the L cervical spine, thoracic spine, PSIS, hamstrings and quadriceps she had physical therapy which completed in August with relief noted from MT techniques. She has home exercises from therapy that she is performing. Aching muscle tightness and pain B thoracic paraspinal muscles between the scapulae. This is localized near the site of a surgical excision of a cyst. Pain is constant, aching. 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 Stretching into extension relieves the pain somewhat. She rates it 4/10 at rest and 8/10 at worst. She has gabapentin for neurologic radicular pain which she notes has significantly improved her B hand symptoms. She is using hs dose only because of sleepiness but still reports relief with this dose. L IT band pain is laterally at mid-femur level. Going upstairs worsens the pain.  Massage/myofascial deep tissue release helps relieve this pain which is also rated 4/10 at best and 8/10 at worst.     She is exhibiting some emotional lability and distractibility during exam today with some tearfulness, inappropriate laughing at times, and rapid speech.      Past Medical History:   Diagnosis Date    Abnormal Pap smear of cervix     Anxiety     Arthritis     NECK    Dental crowns present     UPPER ;AND LOWER    Depression     Difficult intravenous access     AVIOID RT HAND    Dizziness     R/T H-PYLORI    Dysphasia     states has cleared up    Ganglion cyst     LT WRIST. MID BACK    H pylori ulcer 2016    ABD-TREATED WITH ATB FEELING BETTER    HPV in female     Hypothyroidism     Nausea     R/T H PYLORI    Osteoarthritis     Rectal prolapse     SAB (spontaneous )     Shortness of breath     AT TIMES    Wears glasses     Wears glasses       Past Surgical History:   Procedure Laterality Date    APPENDECTOMY      COLONOSCOPY  2016    PT STATES NORMAL    CYST REMOVAL      LT WRIST GANGLION CYSTECTOMY    ENDOMETRIAL ABLATION      OTHER SURGICAL HISTORY  2015    EXCISION LESIONS MID BACK X2    OTHER SURGICAL HISTORY      pre cancer cells removed from cervix    OTHER SURGICAL HISTORY  10/31/2016    EXCISION SCALP LEISION X3    TUBAL LIGATION  1992    UPPER GASTROINTESTINAL ENDOSCOPY  2016    with biopsy    URETHRAL SURGERY N/A 2020    PUDENDAL BLOCK TRIGGER POINT WITH EXPERAL AND KENALOG performed by Amalia Ochoa DO at [address] [place] [address]  16    vag A/P repair with cysto    VAGINA SURGERY  2020    PUDENDAL BLOCK TRIGGER POINT WITH EXPERAL AND KENALOG (N/A )     Family History   Problem Relation Age of Onset    Diabetes Mother     Hypertension Mother     Cancer Mother         KIDNEY    Other Mother         MENTAL HEALTH DISORDER    Arthritis Father         HIP REPLACEMENT    Other Sister         MENTAL DISORDER    Other Sister         FIBROMYALGIA, MENTAL HEALTH ISSUES    Arthritis Sister      Social History     Socioeconomic History    Marital status:      Spouse name: None    Number of children: None    Years of education: None    Highest education level: None   Occupational History    None   Social Needs    Financial resource strain: Not hard at all   [place] insecurity     Worry: Never true     Inability: Never true   [employer] needs     Medical: None     Non-medical: None   Tobacco Use    Smoking status: Never Smoker    Smokeless tobacco: Never Used   Substance and Sexual Activity    Alcohol use: Yes     Alcohol/week: 1.0 standard drinks     Types: 1 Standard drinks or equivalent per week     Comment: BEER, WINE OR MIXED DRINKS 4 DRINKS A MONTH    Drug use: Yes     Types: Marijuana     Comment: occasional    Sexual activity: Yes     Partners: Male   Lifestyle    Physical activity     Days per week: None     Minutes per session: None    Stress: None   Relationships    Social connections     Talks on phone: None     Gets together: None     Attends Rastafarian service: None     Active member of club or organization: None     Attends meetings of clubs or organizations: None     Relationship status: None    Intimate partner violence     Fear of current or ex partner: None     Emotionally abused: None     Physically abused: None     Forced sexual activity: None   Other Topics Concern    None   Social History Narrative    None          Current Outpatient Medications   Medication Sig Dispense Refill    Misc. Devices MISC 1 each by Does not apply route once as needed (self guided deep tissue massage) Please dispense one theracane device. Massage back as tolerated to relieve muscle spasms. 1 Device 0    famotidine (PEPCID) 20 MG tablet Take 1 tablet by mouth 2 times daily 60 tablet 5    gabapentin (NEURONTIN) 100 MG capsule Take 1 capsule by mouth 2 times daily for 30 days.  Intended supply: 30 days 60 capsule 2    zoster recombinant adjuvanted vaccine (SHINGRIX) 50 MCG/0.5ML SUSR injection Inject 0.5 mLs into the muscle See Admin Instructions 1 dose now and repeat in 2-6 months 0.5 mL 1    Turmeric POWD by Does not apply route      IODINE EX        No current facility-administered medications for this visit. Allergies   Allergen Reactions    Biaxin [Clarithromycin] Hives    Minocycline Hives    Prozac [Fluoxetine Hcl] Hives       Subjective:     Review of Systems  Constitutional: Negative for fever, chills and unexpected weight change. HEENT: Negative for trouble swallowing. No acute vision changes. Respiratory: Negative for cough and shortness of breath. Cardiovascular: Negative for chest pain. Endocrine: Negative for polyuria. Genitourinary: Negative for dysuria, urgency,frequency and difficulty urinating. Musculoskeletal: Positive for stiff joints. Neurological: Negative for headaches. Dermatologic: Negative rashes, ulcers, or lesions. Psychiatric: Positive for depressed mood or anxiety. Objective:     Physical Exam  /75   Pulse 64   Temp 98.4 °F (36.9 °C) (Temporal)   Ht 5' 6\" (1.676 m)   Wt 183 lb (83 kg)   LMP 10/30/2016 Comment: negative. BMI 29.54 kg/m²   Constitutional: She is in no distress. She appears well-developed and well-nourished. HEENT:  Normocephalic. EOMI. PERRL. Mucous membranes pink and moist.   Pulmonary/Chest: Respirations WNL and unlabored. Skin: Skin is warm, dry and intact with good turgor. Psychiatric: She has a fluctuating mood and affect. Her behavior is distracted and labile today. MSK: Functional ROM lumbar spine in all planes. ROM cervical spine full on flexion and extension. Limited due to pain on L rotation and L lateral bending. Palpable muscle tightness and trigger points over thoracic paraspinal muscles extending length of the medial scapular border. No tenderness over B SI joints. Very mild tenderness over L piriformis/glut med insertion. Noticeable indentation centrally between scapula at site of cyst excision - well healed.  Strength 5/5 key muscles BUEs. L IT band is supple with no significant tightness noted. Some tenderness to palpation over a discrete spot at mid - lateral aspect of femur. No GT bursa tenderness either side. Neurological: She is alert and oriented to person, place, and time. Spurling's negative to the R, produces pain in L cervical paraspinal muscle but no radiating symptoms to the L arm. She has DTRs 2+. Sensation intact to light touch. EXTREMITIES: No edema. No calf tenderness to palpation bilaterally. Nursing note and vitals reviewed. Imaging:  None    PROCEDURE:  Trigger Point Procedural Note    Indication: Severe pain, pain control    Procedure: 12 (6 on each side)Trigger Points were identified in the B mid-thoracic paraspinal muscles and lower trapezius muscles (4 muscles). The patient was placed in the appropriate position and the area over the trigger point was prepped with iodine and alcohol. Injection was performed into the trigger point area using 0.5 ml Lidocaine 1% into each of the 12 trigger point(s) followed by dry needling. The injection site was covered with a bandage. Complications: None, patient tolerated procedure well. Assessment:       Diagnosis Orders   1. Trigger point of shoulder region, unspecified laterality     2. It band syndrome, left            Plan:      Provided with IT band stretches. Also recommended roller for IT band massage/myofascial release. She was advised to alternate ice massage and heat to affected area over IT band also. Trigger point injections as above. Recommended theracane, heat and stretching to the area at least TIW. Recommended routine yoga and possible Marco Chi to address core strengthening and general stretching. Patient easily distractible today with labile emotions throughout exam. Relates history of schizophrenia in her mother, substance dependence in her father, and ADD in her children.  Discussed psychosomatic components of pain and recommended psychiatric evaluation for this. May be worth psychiatric referral for evaluation of mood lability and attention deficit. Discussed with patient and she is agreeable to evaluation. Will forward to her PCP Dr. Niac Brown for follow up. No orders of the defined types were placed in this encounter. Orders Placed This Encounter   Medications    lidocaine 1 % injection 6 mL    Misc. Devices MISC     Si each by Does not apply route once as needed (self guided deep tissue massage) Please dispense one theracane device. Massage back as tolerated to relieve muscle spasms. Dispense:  1 Device     Refill:  0     Return in about 6 weeks (around 2020). Electronically signed by Michelle Yang MD on 2020 at 11:32 AM.     Please note that this chart was generated using voicerecognition Dragon dictation software. Although every effort was made to ensurethe accuracy of this automated transcription, some errors in transcription may haveoccurred.

## 2020-12-15 ENCOUNTER — OFFICE VISIT (OUTPATIENT)
Dept: PHYSICAL MEDICINE AND REHAB | Age: 55
End: 2020-12-15
Payer: COMMERCIAL

## 2020-12-15 VITALS
SYSTOLIC BLOOD PRESSURE: 128 MMHG | HEART RATE: 56 BPM | TEMPERATURE: 97.3 F | DIASTOLIC BLOOD PRESSURE: 74 MMHG | WEIGHT: 186 LBS | BODY MASS INDEX: 30.99 KG/M2 | HEIGHT: 65 IN

## 2020-12-15 PROCEDURE — 1036F TOBACCO NON-USER: CPT | Performed by: PHYSICAL MEDICINE & REHABILITATION

## 2020-12-15 PROCEDURE — G8427 DOCREV CUR MEDS BY ELIG CLIN: HCPCS | Performed by: PHYSICAL MEDICINE & REHABILITATION

## 2020-12-15 PROCEDURE — 99212 OFFICE O/P EST SF 10 MIN: CPT | Performed by: PHYSICAL MEDICINE & REHABILITATION

## 2020-12-15 PROCEDURE — G8417 CALC BMI ABV UP PARAM F/U: HCPCS | Performed by: PHYSICAL MEDICINE & REHABILITATION

## 2020-12-15 PROCEDURE — 20552 NJX 1/MLT TRIGGER POINT 1/2: CPT | Performed by: PHYSICAL MEDICINE & REHABILITATION

## 2020-12-15 PROCEDURE — G8482 FLU IMMUNIZE ORDER/ADMIN: HCPCS | Performed by: PHYSICAL MEDICINE & REHABILITATION

## 2020-12-15 PROCEDURE — 3017F COLORECTAL CA SCREEN DOC REV: CPT | Performed by: PHYSICAL MEDICINE & REHABILITATION

## 2020-12-15 RX ORDER — LIDOCAINE HYDROCHLORIDE 10 MG/ML
4 INJECTION, SOLUTION INFILTRATION; PERINEURAL ONCE
Status: COMPLETED | OUTPATIENT
Start: 2020-12-15 | End: 2020-12-15

## 2020-12-15 RX ADMIN — LIDOCAINE HYDROCHLORIDE 4 ML: 10 INJECTION, SOLUTION INFILTRATION; PERINEURAL at 14:59

## 2020-12-15 NOTE — PROGRESS NOTES
[facility]  [facility] PHYSICAL MEDICINE AND REHABILITATION  [address] [address]  Dept: [phone]  Dept Fax: [phone]    Outpatient Followup Note    Paula Cunha, 48 y.o., female, presents for follow up c/o of Follow-up  . HPI:     HPI  Patient with neck pain as primary complaint today being seen in follow up. She reports significant relief of thoracic pain after trigger point injections last time and she is continuing to stretch with good relief of those symptoms. Today the L sided neck pain and tightness is her primary concern. It limits her neck rotation and lateral bending. Pain is moderate-severe. She reports numbness and tingling into her L 4th and 5th digits at times but this seems unrelated to today's complaints or her known L C6 radiculopathy. She does note some L tricep pain and tenderness that she describes as \"pinching\" which is also less likely to be related to those issues. She also notes L foot pain on the lateral aspect of her midfoot which is intermittent with intermittent c/o numbness and tingling there as well. It does not appear to be associated with any specific activity. No described exacerbating or remitting factors. No previous history of injury to that area. She continues with rapid tangential speech at times but states that she has not pursued psychiatric follow up through her PCP as she feels she can address the symptoms on her own with holistic methods.      Past Medical History:   Diagnosis Date    Abnormal Pap smear of cervix     Anxiety     Arthritis     NECK    Dental crowns present     UPPER ;AND LOWER    Depression     Difficult intravenous access     AVIOID RT HAND    Dizziness     R/T H-PYLORI    Dysphasia     states has cleared up    Ganglion cyst     LT WRIST. MID BACK    H pylori ulcer 04/2016    ABD-TREATED WITH ATB FEELING BETTER    HPV in female     Hypothyroidism     Nausea     R/T H PYLORI    Osteoarthritis     Rectal prolapse     SAB (spontaneous )     Shortness of breath     AT TIMES    Wears glasses     Wears glasses       Past Surgical History:   Procedure Laterality Date    APPENDECTOMY  1990    COLONOSCOPY  2016    PT STATES NORMAL    CYST REMOVAL      LT WRIST GANGLION CYSTECTOMY    ENDOMETRIAL ABLATION      OTHER SURGICAL HISTORY  2015    EXCISION LESIONS MID BACK X2    OTHER SURGICAL HISTORY      pre cancer cells removed from cervix    OTHER SURGICAL HISTORY  10/31/2016    EXCISION SCALP LEISION X3    TUBAL LIGATION  1992    UPPER GASTROINTESTINAL ENDOSCOPY  2016    with biopsy    URETHRAL SURGERY N/A 2020    PUDENDAL BLOCK TRIGGER POINT WITH EXPERAL AND KENALOG performed by Gabino Vasquez DO at [address] [place] [address]  16    vag A/P repair with cysto    VAGINA SURGERY  2020    PUDENDAL BLOCK TRIGGER POINT WITH EXPERAL AND KENALOG (N/A )     Family History   Problem Relation Age of Onset    Diabetes Mother     Hypertension Mother     Cancer Mother         KIDNEY    Other Mother         MENTAL HEALTH DISORDER    Arthritis Father         HIP REPLACEMENT    Other Sister         MENTAL DISORDER    Other Sister         FIBROMYALGIA, MENTAL HEALTH ISSUES    Arthritis Sister      Social History     Socioeconomic History    Marital status:      Spouse name: None    Number of children: None    Years of education: None    Highest education level: None   Occupational History    None   Social Needs    Financial resource strain: Not hard at all   N[employer] insecurity     Worry: Never true     Inability: Never true    Transportation needs     Medical: None     Non-medical: None   Tobacco Use    Smoking status: Never Smoker    Smokeless tobacco: Never Used   Substance and Sexual Activity    Alcohol use:  Yes     Alcohol/week: 1.0 standard drinks     Types: 1 Standard drinks or equivalent per week     Comment: APRIL, WINE OR MIXED DRINKS 4 DRINKS A MONTH    Drug use: Yes     Types: Marijuana     Comment: occasional    Sexual activity: Yes     Partners: Male   Lifestyle    Physical activity     Days per week: None     Minutes per session: None    Stress: None   Relationships    Social connections     Talks on phone: None     Gets together: None     Attends Rastafari service: None     Active member of club or organization: None     Attends meetings of clubs or organizations: None     Relationship status: None    Intimate partner violence     Fear of current or ex partner: None     Emotionally abused: None     Physically abused: None     Forced sexual activity: None   Other Topics Concern    None   Social History Narrative    None       Current Outpatient Medications   Medication Sig Dispense Refill    famotidine (PEPCID) 20 MG tablet Take 1 tablet by mouth 2 times daily 60 tablet 5    gabapentin (NEURONTIN) 100 MG capsule Take 1 capsule by mouth 2 times daily for 30 days. Intended supply: 30 days 60 capsule 2    Turmeric POWD by Does not apply route      Misc. Devices MISC 1 each by Does not apply route once as needed (self guided deep tissue massage) Please dispense one theracane device. Massage back as tolerated to relieve muscle spasms. 1 Device 0    IODINE EX        No current facility-administered medications for this visit. Allergies   Allergen Reactions    Biaxin [Clarithromycin] Hives    Minocycline Hives    Prozac [Fluoxetine Hcl] Hives       Subjective:      Review of Systems  Constitutional: Negative for fever, chills and unexpected weight change. HENT: Negative for trouble swallowing. Respiratory: Negative for cough and shortness of breath. Cardiovascular: Negative for chest pain. Endocrine: Negative for polyuria. Genitourinary: Negative for dysuria, urgency, frequency, incontinence and difficulty urinating. Gastrointestinal: Negative for constipation or diarrhea.    Musculoskeletal: Positive for arthralgias. Neurological: Positive for headaches, numbness, or tingling. Psychiatric: Positive for labile mood and anxiety. Objective:     Physical Exam  /74   Pulse 56   Temp 97.3 °F (36.3 °C) (Temporal)   Ht 5' 5\" (1.651 m)   Wt 186 lb (84.4 kg)   LMP 10/30/2016 Comment: negative. BMI 30.95 kg/m²   Constitutional: She appears well-developed and well-nourished. In no distress. HEENT: NCAT, PERRL, EOMI. Mucous membranes pink and moist.  Pulmonary/Chest: Respirations WNL and unlabored. MSK: Functional ROM B shoulders, elbows, wrists. Full cervical flexion and extension. Full rotation and lateral bending to the right. Limited rotation to 45 degrees and lateral bending to 60 degrees to the left . Strength 5/5 on shoulder flexion, shoulder abduction, shoulder internal and external rotation bilaterally. B  5/5. B wrist extension and finger extension 5/5. Phalen's negative. Tinel's negative over flexor retinaculum at L wrist and over cubital tunnel L elbow. Neurological: She is alert and oriented to person, place, and time. DTRs 2+ and symmetric BUE and BLEs. Skin: Skin is warm dry and intact with good turgor. Psychiatric: She has a normal mood and affect. Her behavior is normal. Thought content normal.   Nursing note and vitals reviewed. Imaging: None new    PROCEDURE:  Trigger Point Procedural Note    Indication: Severe pain and pain control    Procedure: 8 Trigger Points were identified in the L upper trapezius and cervical paraspinal muscles. The patient was placed in the appropriate position and the area over the trigger point was prepped with iodine and alcohol. Injection was performed into the trigger point area using 0.5 ml Lidocaine 1% into each of the 8 trigger point(s) followed by dry needling. The injection site was covered with a bandage. Complications: None, patient tolerated procedure well. Assessment:       Diagnosis Orders   1.  Trigger point of shoulder region, unspecified laterality          Plan:      She will continue stretching and heat modality at home. She is changing insurance and will call for next appointment pending coverage  No orders of the defined types were placed in this encounter. Orders Placed This Encounter   Medications    lidocaine 1 % injection 4 mL     Return if symptoms worsen or fail to improve, for She will check to see if we are covered under her new insurance. Electronically signedby Diamond Walter MD on 12/15/2020 at 3:05 PM.     Please note that this chartwas generated using voice recognition Dragon dictation software. Although everyeffort was made to ensure the accuracy of this automated transcription, some errorsin transcription may have occurred.

## 2020-12-22 RX ORDER — GABAPENTIN 100 MG/1
CAPSULE ORAL
Qty: 60 CAPSULE | Refills: 2 | Status: SHIPPED | OUTPATIENT
Start: 2020-12-22 | End: 2021-01-21

## 2020-12-22 NOTE — TELEPHONE ENCOUNTER
Vladislav Silvestre is calling to request a refill on the following medication(s):    Last Visit Date (If Applicable):  7/34/7697    Next Visit Date:    Visit date not found    Medication Request:  Requested Prescriptions     Pending Prescriptions Disp Refills    gabapentin (NEURONTIN) 100 MG capsule [Pharmacy Med Name: GABAPENTIN 100 MG CAPSULE] 60 capsule 2     Sig: take 1 capsule by mouth twice a day

## 2021-01-05 ENCOUNTER — TELEPHONE (OUTPATIENT)
Dept: FAMILY MEDICINE CLINIC | Age: 56
End: 2021-01-05

## 2021-01-14 ENCOUNTER — OFFICE VISIT (OUTPATIENT)
Dept: FAMILY MEDICINE CLINIC | Age: 56
End: 2021-01-14
Payer: COMMERCIAL

## 2021-01-14 VITALS
DIASTOLIC BLOOD PRESSURE: 71 MMHG | OXYGEN SATURATION: 96 % | HEART RATE: 90 BPM | SYSTOLIC BLOOD PRESSURE: 135 MMHG | TEMPERATURE: 98 F | WEIGHT: 189.8 LBS | BODY MASS INDEX: 31.58 KG/M2

## 2021-01-14 DIAGNOSIS — R45.7 EMOTIONAL STRESS: Primary | ICD-10-CM

## 2021-01-14 PROCEDURE — 99214 OFFICE O/P EST MOD 30 MIN: CPT | Performed by: FAMILY MEDICINE

## 2021-01-14 ASSESSMENT — PATIENT HEALTH QUESTIONNAIRE - PHQ9
SUM OF ALL RESPONSES TO PHQ9 QUESTIONS 1 & 2: 2
1. LITTLE INTEREST OR PLEASURE IN DOING THINGS: 0

## 2021-01-14 NOTE — PROGRESS NOTES
General FM note    Corky Dubois is a 48 y.o. female who presents today for follow up on her  medical conditions as noted below.   Corky Dubois is c/o of   Chief Complaint   Patient presents with    Other     check up/disability paperwork       Patient Active Problem List:     Cyst of skin and subcutaneous tissue     Neoplasm of uncertain behavior of skin     Abdominal pain     Cystocele     Rectocele     Inflamed epidermoid cyst of skin     Fibroid     H/O LEEP     Past Medical History:   Diagnosis Date    Abnormal Pap smear of cervix     Anxiety     Arthritis     NECK    Dental crowns present     UPPER ;AND LOWER    Depression     Difficult intravenous access     AVIOID RT HAND    Dizziness     R/T H-PYLORI    Dysphasia     states has cleared up    Ganglion cyst     LT WRIST. MID BACK    H pylori ulcer 2016    ABD-TREATED WITH ATB FEELING BETTER    HPV in female     Hypothyroidism     Nausea     R/T H PYLORI    Osteoarthritis     Rectal prolapse     SAB (spontaneous )     Shortness of breath     AT TIMES    Wears glasses     Wears glasses       Past Surgical History:   Procedure Laterality Date    APPENDECTOMY      COLONOSCOPY  2016    PT STATES NORMAL    CYST REMOVAL      LT WRIST GANGLION CYSTECTOMY    ENDOMETRIAL ABLATION      OTHER SURGICAL HISTORY  2015    EXCISION LESIONS MID BACK X2    OTHER SURGICAL HISTORY      pre cancer cells removed from cervix    OTHER SURGICAL HISTORY  10/31/2016    EXCISION SCALP LEISION X3    TUBAL LIGATION  1992    UPPER GASTROINTESTINAL ENDOSCOPY  2016    with biopsy    URETHRAL SURGERY N/A 2020    PUDENDAL BLOCK TRIGGER POINT WITH EXPERAL AND KENALOG performed by Rosalie Gonzalez DO at [address] [place] [address]  16    vag A/P repair with cysto    VAGINA SURGERY  2020    PUDENDAL BLOCK TRIGGER POINT WITH EXPERAL AND KENALOG (N/A )     Family History   Problem Relation Age of Onset  Diabetes Mother     Hypertension Mother     Cancer Mother         KIDNEY    Other Mother         MENTAL HEALTH DISORDER    Arthritis Father         HIP REPLACEMENT    Other Sister         MENTAL DISORDER    Other Sister         FIBROMYALGIA, MENTAL HEALTH ISSUES    Arthritis Sister      Current Outpatient Medications   Medication Sig Dispense Refill    gabapentin (NEURONTIN) 100 MG capsule take 1 capsule by mouth twice a day 60 capsule 2    famotidine (PEPCID) 20 MG tablet Take 1 tablet by mouth 2 times daily 60 tablet 5    Turmeric POWD by Does not apply route      IODINE EX       Misc. Devices MISC 1 each by Does not apply route once as needed (self guided deep tissue massage) Please dispense one theracane device. Massage back as tolerated to relieve muscle spasms. 1 Device 0     No current facility-administered medications for this visit. ALLERGIES:    Allergies   Allergen Reactions    Biaxin [Clarithromycin] Hives    Minocycline Hives    Prozac [Fluoxetine Hcl] Hives       Social History     Tobacco Use    Smoking status: Never Smoker    Smokeless tobacco: Never Used   Substance Use Topics    Alcohol use: Yes     Alcohol/week: 1.0 standard drinks     Types: 1 Standard drinks or equivalent per week     Comment: BEER, WINE OR MIXED DRINKS 4 DRINKS A MONTH      Body mass index is 31.58 kg/m². /71   Pulse 90   Temp 98 °F (36.7 °C)   Wt 189 lb 12.8 oz (86.1 kg)   LMP 10/30/2016 Comment: negative. SpO2 96%   BMI 31.58 kg/m²     Subjective:      HPI    49 yo female coming in today actually for an evaluation for disability papers. The patient has seen me multiple times in the office but never did bring up her mood disorder/emotional stress. The patient states that this has been going on for years and years. That she has a lot of stress headed since she was a child and doing all her work life. She states that she has a history family history of mental disorder.   That she was always the one in the family who kept it straight to get that she was the one who worked a job she was the one who tried to do everything in right she had and broke her drops never called up always was on time always that the best as good as she can but she never got any credit for it and it seems that everything she does she gets blamed for something something is not right all the time and she cannot take it anymore. She feels the emotional stress is doing her no good she says that her body is hurting so much she is not going out of the house and she feels this Covid pandemic is great for her because she does not have to talk to anybody because she feels that talking to somebody just makes her very angry very upset because people expect so much more from her that she can get. The patient quit her stop the job in October just before Covid started due to this emotional stress. Started when she was a child - she worked really hard - and \"they want more and more and they did this to her\". Not able to do a lot of things but then she hurts. She needs to get together. She does not like being mocked bc it is hurting. cannot have her work done bc of arguing all the time. She feels someone is alway on her. \"the past makes her see and she cannot see\". Her mother had hemorrhoid and when the patient was younger and her mother took her apparently in the bathroom and showed her hemorrhoids told her all the blood and told her \"you did this to me \". The patient tells me that she now has also some constipation some hemorrhoids and sometimes she has to get the stool out. Patient is terrified to be in a nursing home to have the same done to her and she feels that it is poor people in the nursing home have to go through a lot because just of the stool removal from the rectum. She feels that she worked to hard - her mom told her to do it and to do it right. People did not talk to her the R way.   The patient just feels that she is totally has been mistreated all her life. What ever she does she cannot to write and she never gets credit for anything and all the stress hurts her quite a bit she has been working out has been doing yoga but she has pain at her left ankle order time she has the cyst at her hand which hurts all the time when she has pain and pain. Review of Systems   Constitutional: Negative for fever and unexpected weight change. Pertinent items are noted in HPI. Objective:   Physical Exam  Constitutional: VS (see above). General appearance: normal development, habitus and attention, no deformities. No distress. Patient gets quite emotional.  Eyes: normal conjunctiva and lids. Skin: no rashes, lesions or ulcers. Musculoskeletal: normal gait. Nails: no clubbing or cyanosis. Psychiatric: alert and oriented to place, time and person. Normal mood and affect. Assessment:       Diagnosis Orders   1. Emotional stress  Alina Calloway, PhD, Psychology, [facility]       Plan:   I discussed with her that I am not able to write her any papers for disability. She needs to see specialist.  Referrals was provided. We will fax this note to the fax number which was provided/[phone]. Patient will call if there is anything needed. No follow-ups on file.   Orders Placed This Encounter   Procedures   Maria Esther Ann, PhD, Psychology, [facility]     Referral Priority:   Routine     Referral Type:   Eval and Treat     Referral Reason:   Specialty Services Required     Referred to Provider:   Isidra Bagley, PhD     Requested Specialty:   Psychology     Number of Visits Requested:   [facility] Physical [facility]     Referral Priority:   Routine     Referral Type:   Eval and Treat     Referral Reason:   Specialty Services Required     Requested Specialty:   Physical Therapy     Number of Visits Requested:   1     No orders of the defined types were placed in this encounter. Call or return to clinic prn if these symptoms worsen or fail to improve as anticipated. I have reviewed the instructions with the patient, answering all questions to her satisfaction. Caroline Cortes received counseling on the following healthy behaviors: nutrition, exercise and medication adherence  Reviewed prior labs and health maintenance. Continue current medications, diet and exercise. Discussed use, benefit, and side effects of prescribed medications. Barriers to medication compliance addressed. Patient given educational materials - see patient instructions. All patient questions answered. Patient voiced understanding.       Electronically signed by Gideon Terry MD on 1/15/2021 at 6:41 AM       (Please note that portions of this note were completed with a voice recognition program. Efforts were made to edit the dictations but occasionally words are mis-transcribed.)

## 2021-01-18 ENCOUNTER — OFFICE VISIT (OUTPATIENT)
Dept: PHYSICAL MEDICINE AND REHAB | Age: 56
End: 2021-01-18
Payer: COMMERCIAL

## 2021-01-18 VITALS
TEMPERATURE: 98.4 F | HEIGHT: 65 IN | WEIGHT: 187.8 LBS | BODY MASS INDEX: 31.29 KG/M2 | SYSTOLIC BLOOD PRESSURE: 134 MMHG | DIASTOLIC BLOOD PRESSURE: 68 MMHG | HEART RATE: 64 BPM

## 2021-01-18 DIAGNOSIS — S86.112A STRAIN OF GASTROCNEMIUS MUSCLE OF LEFT LOWER EXTREMITY, INITIAL ENCOUNTER: Primary | ICD-10-CM

## 2021-01-18 DIAGNOSIS — M25.512 LEFT SHOULDER PAIN, UNSPECIFIED CHRONICITY: ICD-10-CM

## 2021-01-18 PROCEDURE — 99213 OFFICE O/P EST LOW 20 MIN: CPT | Performed by: PHYSICAL MEDICINE & REHABILITATION

## 2021-01-18 NOTE — PROGRESS NOTES
[facility]  [facility] PHYSICAL MEDICINE AND REHABILITATION  [address] [address]  Dept: [phone]  Dept Fax: [phone]    Outpatient Followup Note    Adama Waite, 48 y.o., female, presents for follow up c/o of Follow-up  . HPI:     HPI  Patient with chronic pain in multiple areas being seen in follow up today. She reports improved ROM of her neck but still some pain especially with L rotation. She notes improvement in her pain and tenderness in posterior suboccipital muscles and scalp after trigger point injections at last visit. She reports some L sided mid back pain which she describes as \"pinching pain. \" She also notes some L Achilles pain which she describes as \"hot. \"     She continues to exhibit easy distractibility and tangential thoughts today.      Past Medical History:   Diagnosis Date    Abnormal Pap smear of cervix     Anxiety     Arthritis     NECK    Dental crowns present     UPPER ;AND LOWER    Depression     Difficult intravenous access     AVIOID RT HAND    Dizziness     R/T H-PYLORI    Dysphasia     states has cleared up    Ganglion cyst     LT WRIST. MID BACK    H pylori ulcer 2016    ABD-TREATED WITH ATB FEELING BETTER    HPV in female     Hypothyroidism     Nausea     R/T H PYLORI    Osteoarthritis     Rectal prolapse     SAB (spontaneous )     Shortness of breath     AT TIMES    Wears glasses     Wears glasses       Past Surgical History:   Procedure Laterality Date    APPENDECTOMY      COLONOSCOPY  2016    PT STATES NORMAL    CYST REMOVAL      LT WRIST GANGLION CYSTECTOMY    ENDOMETRIAL ABLATION      OTHER SURGICAL HISTORY  2015    EXCISION LESIONS MID BACK X2    OTHER SURGICAL HISTORY      pre cancer cells removed from cervix    OTHER SURGICAL HISTORY  10/31/2016    EXCISION SCALP LEISION X3    TUBAL LIGATION  1992    UPPER GASTROINTESTINAL ENDOSCOPY  2016    with biopsy    URETHRAL SURGERY N/A 5/5/2020    PUDENDAL BLOCK TRIGGER POINT WITH EXPERAL AND KENALOG performed by Contreras Ragland DO at [address] [place] [address]  6-13-16    vag A/P repair with cysto    VAGINA SURGERY  05/05/2020    PUDENDAL BLOCK TRIGGER POINT WITH EXPERAL AND KENALOG (N/A )     Family History   Problem Relation Age of Onset    Diabetes Mother     Hypertension Mother     Cancer Mother         KIDNEY    Other Mother         MENTAL HEALTH DISORDER    Arthritis Father         HIP REPLACEMENT    Other Sister         MENTAL DISORDER    Other Sister         FIBROMYALGIA, MENTAL HEALTH ISSUES    Arthritis Sister      Social History     Socioeconomic History    Marital status:      Spouse name: None    Number of children: None    Years of education: None    Highest education level: None   Occupational History    None   Social Needs    Financial resource strain: Not hard at all   [employer] insecurity     Worry: Never true     Inability: Never true   H[employer] needs     Medical: None     Non-medical: None   Tobacco Use    Smoking status: Never Smoker    Smokeless tobacco: Never Used   Substance and Sexual Activity    Alcohol use:  Yes     Alcohol/week: 1.0 standard drinks     Types: 1 Standard drinks or equivalent per week     Comment: BEER, WINE OR MIXED DRINKS 4 DRINKS A MONTH    Drug use: Yes     Types: Marijuana     Comment: occasional    Sexual activity: Yes     Partners: Male   Lifestyle    Physical activity     Days per week: None     Minutes per session: None    Stress: None   Relationships    Social connections     Talks on phone: None     Gets together: None     Attends Yarsani service: None     Active member of club or organization: None     Attends meetings of clubs or organizations: None     Relationship status: None    Intimate partner violence     Fear of current or ex partner: None     Emotionally abused: None     Physically abused: None     Forced sexual Functional ROM cervical spine in all planes and B shoulders. No palpable muscle spasm or trigger points cervical paraspinal muscles. Tightness noted L gastrocnemius. Strength 5/5 key muscles BUE and BLEs. Neurological: She is alert and oriented to person, place, and time. DTRs 2+ and symmetric. Negative Fournier's bilaterally. Negative straight leg raise bilaterally. Gait symmetric with good stride length and no abnormalities  Skin: Skin is warm dry and intact with good turgor. Psychiatric: She has a normal mood and affect. Her behavior is normal. Thought content normal.   Nursing note and vitals reviewed. Imaging: None new    Assessment:       Diagnosis Orders   1. Strain of gastrocnemius muscle of left lower extremity, initial encounter     2. Left shoulder pain, unspecified chronicity          Plan:      Discussed TENS unit for chronic pain which is in multiple varying locations. Offered option of compound cream - she will defer pending her response to TENS unit. Reviewed disability paperwork today. 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 Forms are asking for psychologic and physical restrictions from work. She has FCE scheduled and has been recommended to see psychologist. Will defer on completing forms pending those results. No orders of the defined types were placed in this encounter. Orders Placed This Encounter   Medications    Tens Unit MISC     Sig: by Does not apply route For Pain Management to L arm and L ankle    With necessary equipment     Dispense:  1 each     Refill:  0     Return in about 3 months (around 4/18/2021). Electronically signedby Heidy Resendiz MD on 1/26/2021 at 3:56 PM.     Please note that this chartwas generated using voice recognition Dragon dictation software. Although everyeffort was made to ensure the accuracy of this automated transcription, some errorsin transcription may have occurred.

## 2021-01-25 ENCOUNTER — TELEPHONE (OUTPATIENT)
Dept: FAMILY MEDICINE CLINIC | Age: 56
End: 2021-01-25

## 2021-01-26 ENCOUNTER — HOSPITAL ENCOUNTER (OUTPATIENT)
Dept: PHYSICAL THERAPY | Facility: CLINIC | Age: 56
Setting detail: THERAPIES SERIES
Discharge: HOME | End: 2021-01-26

## 2021-01-26 PROCEDURE — 97750 PHYSICAL PERFORMANCE TEST: CPT

## 2021-02-26 ENCOUNTER — TELEPHONE (OUTPATIENT)
Dept: FAMILY MEDICINE CLINIC | Age: 56
End: 2021-02-26

## 2021-02-26 NOTE — TELEPHONE ENCOUNTER
Per my note from 1/25/2021 I am not able to fill out the paperwork she needs to be seen at the psychiatrist office. Thank you.

## 2021-04-20 ENCOUNTER — OFFICE VISIT (OUTPATIENT)
Dept: PHYSICAL MEDICINE AND REHAB | Age: 56
End: 2021-04-20
Payer: COMMERCIAL

## 2021-04-20 VITALS — WEIGHT: 178.4 LBS | HEIGHT: 65 IN | BODY MASS INDEX: 29.72 KG/M2

## 2021-04-20 DIAGNOSIS — M54.2 TRIGGER POINT WITH NECK PAIN: Primary | ICD-10-CM

## 2021-04-20 DIAGNOSIS — M79.2 NEUROPATHIC PAIN: ICD-10-CM

## 2021-04-20 PROCEDURE — 99213 OFFICE O/P EST LOW 20 MIN: CPT | Performed by: PHYSICAL MEDICINE & REHABILITATION

## 2021-04-20 PROCEDURE — 20552 NJX 1/MLT TRIGGER POINT 1/2: CPT | Performed by: PHYSICAL MEDICINE & REHABILITATION

## 2021-04-20 RX ORDER — LIDOCAINE HYDROCHLORIDE 10 MG/ML
3 INJECTION, SOLUTION INFILTRATION; PERINEURAL ONCE
Status: COMPLETED | OUTPATIENT
Start: 2021-04-20 | End: 2021-04-20

## 2021-04-20 RX ADMIN — LIDOCAINE HYDROCHLORIDE 3 ML: 10 INJECTION, SOLUTION INFILTRATION; PERINEURAL at 17:02

## 2021-04-20 NOTE — PROGRESS NOTES
[facility]  [facility] PHYSICAL MEDICINE AND REHABILITATION  [address] [address]  Dept: [phone]  Dept Fax: [phone]    Outpatient Followup Note    Lizbeth Aldrich, 48 y.o., female, presents for follow up c/o of Chronic Pain (worst pain today is on left side neck and hip down to calf)  . HPI:     HPI  Patient with chronic pain in multiple areas who is being seen in follow up today. She reports some improvement in her mobility and complaints of pain with yoga and use of TENS unit. She is using the TENS twice daily. She still has some c/o neurologic pain in her hands and L leg. She has not been taking gabapentin due to concerns about the medication.  She has not obtained the compound cream due to cost.     Past Medical History:   Diagnosis Date    Abnormal Pap smear of cervix     Anxiety     Arthritis     NECK    Dental crowns present     UPPER ;AND LOWER    Depression     Difficult intravenous access     AVIOID RT HAND    Dizziness     R/T H-PYLORI    Dysphasia     states has cleared up    Ganglion cyst     LT WRIST. MID BACK    H pylori ulcer 2016    ABD-TREATED WITH ATB FEELING BETTER    HPV in female     Hypothyroidism     Nausea     R/T H PYLORI    Osteoarthritis     Rectal prolapse     SAB (spontaneous )     Shortness of breath     AT TIMES    Wears glasses     Wears glasses       Past Surgical History:   Procedure Laterality Date    APPENDECTOMY      COLONOSCOPY  2016    PT STATES NORMAL    CYST REMOVAL      LT WRIST GANGLION CYSTECTOMY    ENDOMETRIAL ABLATION      OTHER SURGICAL HISTORY  2015    EXCISION LESIONS MID BACK X2    OTHER SURGICAL HISTORY      pre cancer cells removed from cervix    OTHER SURGICAL HISTORY  10/31/2016    EXCISION SCALP LEISION X3    TUBAL LIGATION  1992    UPPER GASTROINTESTINAL ENDOSCOPY  2016    with biopsy    URETHRAL SURGERY N/A 2020    PUDENDAL BLOCK TRIGGER POINT WITH EXPERAL AND KENALOG performed by Magda Lucero DO at [address] [place] [address]  6-13-16    vag A/P repair with cysto    VAGINA SURGERY  05/05/2020    PUDENDAL BLOCK TRIGGER POINT WITH EXPERAL AND KENALOG (N/A )     Family History   Problem Relation Age of Onset    Diabetes Mother     Hypertension Mother     Cancer Mother         KIDNEY    Other Mother         MENTAL HEALTH DISORDER    Arthritis Father         HIP REPLACEMENT    Other Sister         MENTAL DISORDER    Other Sister         FIBROMYALGIA, MENTAL HEALTH ISSUES    Arthritis Sister      Social History     Socioeconomic History    Marital status:      Spouse name: None    Number of children: None    Years of education: None    Highest education level: None   Occupational History    None   Social Needs    Financial resource strain: Not hard at all   Tendril insecurity     Worry: Never true     Inability: Never true   Plexxi needs     Medical: None     Non-medical: None   Tobacco Use    Smoking status: Never Smoker    Smokeless tobacco: Never Used   Substance and Sexual Activity    Alcohol use:  Yes     Alcohol/week: 1.0 standard drinks     Types: 1 Standard drinks or equivalent per week     Comment: BEER, WINE OR MIXED DRINKS 4 DRINKS A MONTH    Drug use: Yes     Types: Marijuana     Comment: occasional    Sexual activity: Yes     Partners: Male   Lifestyle    Physical activity     Days per week: None     Minutes per session: None    Stress: None   Relationships    Social connections     Talks on phone: None     Gets together: None     Attends Mosque service: None     Active member of club or organization: None     Attends meetings of clubs or organizations: None     Relationship status: None    Intimate partner violence     Fear of current or ex partner: None     Emotionally abused: None     Physically abused: None     Forced sexual activity: None   Other Topics Concern    None   Social History Narrative    None       Current Outpatient Medications   Medication Sig Dispense Refill    Tens Unit MISC by Does not apply route For Pain Management to L arm and L ankle    With necessary equipment 1 each 0    famotidine (PEPCID) 20 MG tablet Take 1 tablet by mouth 2 times daily 60 tablet 5    Turmeric POWD by Does not apply route      IODINE EX       gabapentin (NEURONTIN) 100 MG capsule take 1 capsule by mouth twice a day 60 capsule 2    Misc. Devices MISC 1 each by Does not apply route once as needed (self guided deep tissue massage) Please dispense one theracane device. Massage back as tolerated to relieve muscle spasms. 1 Device 0     No current facility-administered medications for this visit. Allergies   Allergen Reactions    Biaxin [Clarithromycin] Hives    Minocycline Hives    Prozac [Fluoxetine Hcl] Hives       Subjective:      Review of Systems  Constitutional: Negative for fever, chills and unexpected weight change. HENT: Negative for trouble swallowing. Respiratory: Negative for cough and shortness of breath. Cardiovascular: Negative for chest pain. Endocrine: Negative for polyuria. Genitourinary: Negative for dysuria, urgency, frequency, incontinence and difficulty urinating. Gastrointestinal: Negative for constipation or diarrhea. Musculoskeletal: Positive for arthralgias/myalgias. Neurological: Negative for headaches, numbness, or tingling. Psychiatric: Positive for depressed mood or anxiety. Objective:     Physical Exam  Ht 5' 5\" (1.651 m)   Wt 178 lb 6.4 oz (80.9 kg)   LMP 10/30/2016 Comment: negative. BMI 29.69 kg/m²   Constitutional: She appears well-developed and well-nourished. In no distress. HEENT: NCAT, PERRL, EOMI. Mucous membranes pink and moist.  Pulmonary/Chest: Respirations WNL and unlabored. MSK: Functional ROM lumbar spine and BLEs. Exhibiting good flexibility on forward lumbar flexion - able to touch the floor.  Strength 5/5 key transcription, some errorsin transcription may have occurred.

## 2021-11-02 ENCOUNTER — TELEPHONE (OUTPATIENT)
Dept: FAMILY MEDICINE CLINIC | Age: 56
End: 2021-11-02

## 2022-03-24 ENCOUNTER — OFFICE VISIT (OUTPATIENT)
Dept: FAMILY MEDICINE CLINIC | Age: 57
End: 2022-03-24
Payer: COMMERCIAL

## 2022-03-24 VITALS
TEMPERATURE: 98.2 F | DIASTOLIC BLOOD PRESSURE: 67 MMHG | OXYGEN SATURATION: 96 % | HEART RATE: 65 BPM | SYSTOLIC BLOOD PRESSURE: 125 MMHG | WEIGHT: 187 LBS | BODY MASS INDEX: 31.12 KG/M2

## 2022-03-24 DIAGNOSIS — R10.84 GENERALIZED ABDOMINAL PAIN: ICD-10-CM

## 2022-03-24 DIAGNOSIS — E55.9 VITAMIN D DEFICIENCY: ICD-10-CM

## 2022-03-24 DIAGNOSIS — R73.03 PREDIABETES: ICD-10-CM

## 2022-03-24 DIAGNOSIS — Z13.220 ENCOUNTER FOR LIPID SCREENING FOR CARDIOVASCULAR DISEASE: ICD-10-CM

## 2022-03-24 DIAGNOSIS — Z11.59 NEED FOR HEPATITIS C SCREENING TEST: ICD-10-CM

## 2022-03-24 DIAGNOSIS — Z13.1 DIABETES MELLITUS SCREENING: ICD-10-CM

## 2022-03-24 DIAGNOSIS — Z13.6 ENCOUNTER FOR LIPID SCREENING FOR CARDIOVASCULAR DISEASE: ICD-10-CM

## 2022-03-24 DIAGNOSIS — Z00.01 ENCOUNTER FOR WELL ADULT EXAM WITH ABNORMAL FINDINGS: Primary | ICD-10-CM

## 2022-03-24 DIAGNOSIS — R45.7 EMOTIONAL STRESS: ICD-10-CM

## 2022-03-24 DIAGNOSIS — Z12.31 ENCOUNTER FOR SCREENING MAMMOGRAM FOR BREAST CANCER: ICD-10-CM

## 2022-03-24 LAB — HBA1C MFR BLD: 5.8 %

## 2022-03-24 PROCEDURE — 83036 HEMOGLOBIN GLYCOSYLATED A1C: CPT | Performed by: FAMILY MEDICINE

## 2022-03-24 PROCEDURE — 99396 PREV VISIT EST AGE 40-64: CPT | Performed by: FAMILY MEDICINE

## 2022-03-24 PROCEDURE — G8484 FLU IMMUNIZE NO ADMIN: HCPCS | Performed by: FAMILY MEDICINE

## 2022-03-24 SDOH — ECONOMIC STABILITY: FOOD INSECURITY: WITHIN THE PAST 12 MONTHS, YOU WORRIED THAT YOUR FOOD WOULD RUN OUT BEFORE YOU GOT MONEY TO BUY MORE.: NEVER TRUE

## 2022-03-24 SDOH — ECONOMIC STABILITY: FOOD INSECURITY: WITHIN THE PAST 12 MONTHS, THE FOOD YOU BOUGHT JUST DIDN'T LAST AND YOU DIDN'T HAVE MONEY TO GET MORE.: NEVER TRUE

## 2022-03-24 ASSESSMENT — PATIENT HEALTH QUESTIONNAIRE - PHQ9
SUM OF ALL RESPONSES TO PHQ QUESTIONS 1-9: 0
SUM OF ALL RESPONSES TO PHQ9 QUESTIONS 1 & 2: 0
1. LITTLE INTEREST OR PLEASURE IN DOING THINGS: 0
2. FEELING DOWN, DEPRESSED OR HOPELESS: 0
SUM OF ALL RESPONSES TO PHQ QUESTIONS 1-9: 0

## 2022-03-24 ASSESSMENT — SOCIAL DETERMINANTS OF HEALTH (SDOH): HOW HARD IS IT FOR YOU TO PAY FOR THE VERY BASICS LIKE FOOD, HOUSING, MEDICAL CARE, AND HEATING?: NOT HARD AT ALL

## 2022-03-24 NOTE — PROGRESS NOTES
Well Adult Note  Name: Erik Gottron Date: 3/25/2022   MRN: [id number] Sex: Female   Age: 55 y.o. Ethnicity: Non- / Non    : 1965 Race: White (non-)      Mya Jones is here for well adult exam.    History:  The patient is coming in today for well visit but she tells me about her reading about the disability. The patient is as at every visit very excited and very talkative. She tells me again about her past medical history she tells me again that she actually did what she was supposed to do but nobody gave her a chance nobody gave her any trust or any leaving her. She feels that she really does not have any mental disease but she will go to the [facility] treatment in [facility] hopefully if she feels this will help calm her overall down and put in a better place. Always in fight and flight chest forwards she pushes herself like a dog. Her rectum is always out but she somehing in her body is twisted up like nerve damage in L leg and L arm. she fights her neck she always keep moveing neck very tight and then lifitng up L shoulder feels like a bone rubbing. She held her l arm up all the time in the past.  With [facility] exercises she does at her house she makes her feel self better. If she turns back-and-forth she knows she has gut Problems. Had damage in her back from herniated disc which was diagnosed in the past L leg pain and toes getting numb. She worked hard and she did not have a good life. But she is trying to do better. Her kids are doing well. She got trigger shots did help and her acupuncture done 1 months helped quite a bit but the specialist told her that she needs to have blood work done. Her nose was stinging, with gushing blood she fell 5 years ago. With the acupuncture her nose has much improved. Review of Systems   Pertinent items are noted in HPI.     Allergies   Allergen Reactions    Biaxin [Clarithromycin] Hives    Minocycline Hives    Prozac [Fluoxetine Hcl] Hives         Prior to Visit Medications    Medication Sig Taking? Authorizing Provider   Tens Unit MISC by Does not apply route For Pain Management to L arm and L ankle    With necessary equipment  Patient not taking: Reported on 3/24/2022  Norm Duke MD   gabapentin (NEURONTIN) 100 MG capsule take 1 capsule by mouth twice a day  Catrachita Mancia MD   Misc. Devices MISC 1 each by Does not apply route once as needed (self guided deep tissue massage) Please dispense one theracane device. Massage back as tolerated to relieve muscle spasms.   Norm Duke MD   famotidine (PEPCID) 20 MG tablet Take 1 tablet by mouth 2 times daily  Patient not taking: Reported on 3/24/2022  Catrachita Mancia MD   Turmeric POWD by Does not apply route  Patient not taking: Reported on 3/24/2022  Historical Provider, MD   IODINE EX   Historical Provider, MD         Past Medical History:   Diagnosis Date    Abnormal Pap smear of cervix     Anxiety     Arthritis     NECK    Dental crowns present     UPPER ;AND LOWER    Depression     Difficult intravenous access     AVIOID RT HAND    Dizziness     R/T H-PYLORI    Dysphasia     states has cleared up    Ganglion cyst     LT WRIST. MID BACK    H pylori ulcer 2016    ABD-TREATED WITH ATB FEELING BETTER    HPV in female     Hypothyroidism     Nausea     R/T H PYLORI    Osteoarthritis     Rectal prolapse     SAB (spontaneous )     Shortness of breath     AT TIMES    Wears glasses     Wears glasses        Past Surgical History:   Procedure Laterality Date    APPENDECTOMY      COLONOSCOPY  2016    PT STATES NORMAL    CYST REMOVAL      LT WRIST GANGLION CYSTECTOMY    ENDOMETRIAL ABLATION      OTHER SURGICAL HISTORY  2015    EXCISION LESIONS MID BACK X2    OTHER SURGICAL HISTORY      pre cancer cells removed from cervix    OTHER SURGICAL HISTORY  10/31/2016    EXCISION SCALP LEISION X3    TUBAL LIGATION  1992  UPPER GASTROINTESTINAL ENDOSCOPY  1/6/2016    with biopsy    URETHRAL SURGERY N/A 5/5/2020    PUDENDAL BLOCK TRIGGER POINT WITH EXPERAL AND KENALOG performed by Gómez Balderas DO at [address] [place] [address]  6-13-16    vag A/P repair with cysto    VAGINA SURGERY  05/05/2020    PUDENDAL BLOCK TRIGGER POINT WITH EXPERAL AND KENALOG (N/A )         Family History   Problem Relation Age of Onset    Diabetes Mother     Hypertension Mother     Cancer Mother         KIDNEY    Other Mother         MENTAL HEALTH DISORDER    Arthritis Father         HIP REPLACEMENT    Other Sister         MENTAL DISORDER    Other Sister         FIBROMYALGIA, MENTAL HEALTH ISSUES    Arthritis Sister        Social History     Tobacco Use    Smoking status: Never Smoker    Smokeless tobacco: Never Used   Vaping Use    Vaping Use: Never used   Substance Use Topics    Alcohol use: Yes     Alcohol/week: 1.0 standard drink     Types: 1 Standard drinks or equivalent per week     Comment: BEER, WINE OR MIXED DRINKS 4 DRINKS A MONTH    Drug use: Yes     Types: Marijuana Rondi Baba)     Comment: occasional       Objective   /67   Pulse 65   Temp 98.2 °F (36.8 °C)   Wt 187 lb (84.8 kg)   LMP 10/30/2016 Comment: negative. SpO2 96%   BMI 31.12 kg/m²   Wt Readings from Last 3 Encounters:   03/24/22 187 lb (84.8 kg)   04/20/21 178 lb 6.4 oz (80.9 kg)   01/18/21 187 lb 12.8 oz (85.2 kg)     There were no vitals filed for this visit. Physical Exam  HENT:   /67   Pulse 65   Temp 98.2 °F (36.8 °C)   Wt 187 lb (84.8 kg)   LMP 10/30/2016 Comment: negative. SpO2 96%   BMI 31.12 kg/m²   Constitutional: Alert and oriented. Well-nourished. Head: Normocephalic and atraumatic. Right Ear: External ear normal. TM: no bulging, erythema or fluid seen. Left Ear: External ear normal. TM: no bulging, erythema or fluid seen.   Nose: Nose normal.   Mouth/Throat: Oropharynx is clear and moist.   Eyes: Pupils are equal, round, and reactive to light. Right eye exhibits no discharge. Left eye exhibits no discharge. No scleral icterus. Neck: Normal range of motion. Neck supple. No JVD present. No tracheal deviation present. No thyromegaly present. Cardiovascular: Normal rate, regular rhythm, normal heart sounds. Pulmonary/Chest: Effort normal and breath sounds normal. No respiratory distress. She has no wheezes. She has no rales. Abdominal: Soft. Bowel sounds are normal.  She exhibits no distension and no mass. There is no tenderness. There is no rebound and no guarding. Musculoskeletal: Normal range of motion. She exhibits no edema or tenderness. Lymphadenopathy:    She has no cervical adenopathy. Neurological:  She is alert and oriented to person, place, and time. Cranial nerves grossly intact. No sensation problem noted. Muscle strength 5/5 throughout. Skin: Skin is warm and dry. No rash noted. No erythema. Psychiatric:  She has a normal mood and affect. Behavior is normal.  Very agitated very busy very talkative. Dwayne Canada was seen today for annual exam.    Diagnoses and all orders for this visit:    Encounter for well adult exam with abnormal findings    Prediabetes  -     POCT glycosylated hemoglobin (Hb A1C)    Emotional stress    Generalized abdominal pain  -     CBC with Auto Differential; Future  -     Comprehensive Metabolic Panel; Future  -     Lipid Panel; Future  -     Urinalysis; Future  -     TSH with Reflex; Future    Vitamin D deficiency  -     Vitamin D 25 Hydroxy; Future    Encounter for lipid screening for cardiovascular disease  -     Lipid Panel; Future    Diabetes mellitus screening  -     Comprehensive Metabolic Panel; Future    Encounter for screening mammogram for breast cancer  -     MONSERRAT Digital Screen Bilateral [[id number]]; Future    Need for hepatitis C screening test  -     Hepatitis C Antibody; Future    Blood work ordered.   I also discussed with patient to follow-up with the specialist as indicated. The patient will call me if the abdominal discomfort will continue. She also will make an appointment with a gynecologist for follow-up. Call or return to clinic prn if these symptoms worsen or fail to improve as anticipated. I have reviewed the instructions with the patient, answering all questions to her satisfaction. Personalized Preventive Plan   Current Health Maintenance Status  Immunization History   Administered Date(s) Administered    COVID-19, Pfizer Purple top, DILUTE for use, 12+ yrs, 30mcg/0.3mL dose 04/16/2021, 05/07/2021, 11/16/2021    Influenza Virus Vaccine 12/20/2016    Influenza, Quadv, IM, PF (6 mo and older Fluzone, Flulaval, Fluarix, and 3 yrs and older Afluria) 09/14/2020    Tdap (Boostrix, Adacel) 07/23/2019        Health Maintenance   Topic Date Due    Hepatitis C screen  Never done    Breast cancer screen  07/24/2021    Flu vaccine (1) 09/01/2021    A1C test (Diabetic or Prediabetic)  03/24/2023    Depression Screen  03/24/2023    Cervical cancer screen  07/17/2024    Lipid screen  05/28/2025    Colorectal Cancer Screen  06/06/2026    DTaP/Tdap/Td vaccine (2 - Td or Tdap) 07/23/2029    Shingles Vaccine  Completed    COVID-19 Vaccine  Completed    HIV screen  Completed    Hepatitis A vaccine  Aged Out    Hepatitis B vaccine  Aged Out    Hib vaccine  Aged Out    Meningococcal (ACWY) vaccine  Aged Out    Pneumococcal 0-64 years Vaccine  Aged Out     Recommendations for Intellicyt Due: see orders and patient instructions/AVS.    Return in about 6 months (around 9/24/2022), or if symptoms worsen or fail to improve.      (Please note that portions of this note were completed with a voice recognition program. Efforts were made to edit the dictations but occasionally words are mis-transcribed.)

## 2022-03-24 NOTE — PATIENT INSTRUCTIONS
Eating Healthy Foods: Care Instructions  Your Care Instructions     Eating healthy foods can help lower your risk for disease. Healthy food gives you energy and keeps your heart strong, your brain active, your muscles working, and your bones strong. A healthy diet includes a variety of foods from the basic food groups: grains, vegetables, fruits, milk and milk products, and meat and beans. Some people may eat more of their favorite foods from only one food group and, as a result, miss getting the nutrients they need. So, it is important to pay attention not only to what you eat but also to what you are missing from your diet. You can eat a healthy, balanced diet by making a few small changes. Follow-up care is a key part of your treatment and safety. Be sure to make and go to all appointments, and call your doctor if you are having problems. It's also a good idea to know your test results and keep a list of the medicines you take. How can you care for yourself at home? Look at what you eat  · Keep a food diary for a week or two and record everything you eat or drink. Track the number of servings you eat from each food group. · For a balanced diet every day, eat a variety of:  ? 6 or more ounce-equivalents of grains, such as cereals, breads, crackers, rice, or pasta, every day. An ounce-equivalent is 1 slice of bread, 1 cup of ready-to-eat cereal, or ½ cup of cooked rice, cooked pasta, or cooked cereal.  ? 2½ cups of vegetables, especially:  § Dark-green vegetables such as broccoli and spinach. § Orange vegetables such as carrots and sweet potatoes. § Dry beans (such as henriquez and kidney beans) and peas (such as lentils). ? 2 cups of fresh, frozen, or canned fruit. A small apple or 1 banana or orange equals 1 cup. ? 3 cups of nonfat or low-fat milk, yogurt, or other milk products. ? 5½ ounces of meat and beans, such as chicken, fish, lean meat, beans, nuts, and seeds.  One egg, 1 tablespoon of peanut butter, ½ ounce nuts or seeds, or ¼ cup of cooked beans equals 1 ounce of meat. · Learn how to read food labels for serving sizes and ingredients. Fast-food and convenience-food meals often contain few or no fruits or vegetables. Make sure you eat some fruits and vegetables to make the meal more nutritious. · Look at your food diary. For each food group, add up what you have eaten and then divide the total by the number of days. This will give you an idea of how much you are eating from each food group. See if you can find some ways to change your diet to make it more healthy. Start small  · Do not try to make dramatic changes to your diet all at once. You might feel that you are missing out on your favorite foods and then be more likely to fail. · Start slowly, and gradually change your habits. Try some of the following:  ? Use whole wheat bread instead of white bread. ? Use nonfat or low-fat milk instead of whole milk. ? Eat brown rice instead of white rice, and eat whole wheat pasta instead of white-flour pasta. ? Try low-fat cheeses and low-fat yogurt. ? Add more fruits and vegetables to meals and have them for snacks. ? Add lettuce, tomato, cucumber, and onion to sandwiches. ? Add fruit to yogurt and cereal.  Enjoy food  · You can still eat your favorite foods. You just may need to eat less of them. If your favorite foods are high in fat, salt, and sugar, limit how often you eat them, but do not cut them out entirely. · Eat a wide variety of foods. Make healthy choices when eating out  · The type of restaurant you choose can help you make healthy choices. Even fast-food chains are now offering more low-fat or healthier choices on the menu. · Choose smaller portions, or take half of your meal home. · When eating out, try:  ? A veggie pizza with a whole wheat crust or grilled chicken (instead of sausage or pepperoni).   ? Pasta with roasted vegetables, grilled chicken, or marinara sauce instead of cream sauce. ? A vegetable wrap or grilled chicken wrap. ? Broiled or poached food instead of fried or breaded items. Make healthy choices easy  · Buy packaged, prewashed, ready-to-eat fresh vegetables and fruits, such as baby carrots, salad mixes, and chopped or shredded broccoli and cauliflower. · Buy packaged, presliced fruits, such as melon or pineapple. · Choose 100% fruit or vegetable juice instead of soda. Limit juice intake to 4 to 6 oz (½ to ¾ cup) a day. · Blend low-fat yogurt, fruit juice, and canned or frozen fruit to make a smoothie for breakfast or a snack. Where can you learn more? Go to https://Quack.Envivio. org and sign in to your Money M[employer] account. Enter D231 in the [employer] box to learn more about \"Eating Healthy Foods: Care Instructions. \"     If you do not have an account, please click on the \"Sign Up Now\" link. Current as of: September 8, 2021               Content Version: 13.1  © 6562-8418 [employer]. Care instructions adapted under license by [facility] ([facility]). If you have questions about a medical condition or this instruction, always ask your healthcare professional. [place] [address] any warranty or liability for your use of this information. Well Visit, Women 48 to 72: Care Instructions  Overview     Well visits can help you stay healthy. Your doctor has checked your overall health and may have suggested ways to take good care of yourself. Your doctor also may have recommended tests. At home, you can help prevent illness with healthy eating, regular exercise, and other steps. Follow-up care is a key part of your treatment and safety. Be sure to make and go to all appointments, and call your doctor if you are having problems. It's also a good idea to know your test results and keep a list of the medicines you take. How can you care for yourself at home? · Get screening tests that you and your doctor decide on. Screening helps find diseases before any symptoms appear. · Eat healthy foods. Choose fruits, vegetables, whole grains, protein, and low-fat dairy foods. Limit fat, especially saturated fat. Reduce salt in your diet. · Limit alcohol. Have no more than 1 drink a day or 7 drinks a week. · Get at least 30 minutes of exercise on most days of the week. Walking is a good choice. You also may want to do other activities, such as running, swimming, cycling, or playing tennis or team sports. · Reach and stay at a healthy weight. This will lower your risk for many problems, such as obesity, diabetes, heart disease, and high blood pressure. · Do not smoke. Smoking can make health problems worse. If you need help quitting, talk to your doctor about stop-smoking programs and medicines. These can increase your chances of quitting for good. · Care for your mental health. It is easy to get weighed down by worry and stress. Learn strategies to manage stress, like deep breathing and mindfulness, and stay connected with your family and community. If you find you often feel sad or hopeless, talk with your doctor. Treatment can help. · Talk to your doctor about whether you have any risk factors for sexually transmitted infections (STIs). You can help prevent STIs if you wait to have sex with a new partner (or partners) until you've each been tested for STIs. It also helps if you use condoms (male or female condoms) and if you limit your sex partners to one person who only has sex with you. Vaccines are available for some STIs. · If you think you may have a problem with alcohol or drug use, talk to your doctor. This includes prescription medicines (such as amphetamines and opioids) and illegal drugs (such as cocaine and methamphetamine). Your doctor can help you figure out what type of treatment is best for you. · Protect your skin from too much sun.  When you're outdoors from 10 a.m. to 4 p.m., stay in the shade or cover up with clothing and a hat with a wide brim. Wear sunglasses that block UV rays. Even when it's cloudy, put broad-spectrum sunscreen (SPF 30 or higher) on any exposed skin. · See a dentist one or two times a year for checkups and to have your teeth cleaned. · Wear a seat belt in the car. When should you call for help? Watch closely for changes in your health, and be sure to contact your doctor if you have any problems or symptoms that concern you. Where can you learn more? Go to https://Cardozpepiceweb.Acopio. org and sign in to your WellGen account. Enter Q955 in the [employer] box to learn more about \"Well Visit, Women 50 to 72: Care Instructions. \"     If you do not have an account, please click on the \"Sign Up Now\" link. Current as of: October 6, 2021               Content Version: 13.1  © 9104-3178 [employer]. Care instructions adapted under license by [facility] ([facility]). If you have questions about a medical condition or this instruction, always ask your healthcare professional. [place] [address] any warranty or liability for your use of this information.

## 2022-03-30 ENCOUNTER — HOSPITAL ENCOUNTER (OUTPATIENT)
Age: 57
Setting detail: SPECIMEN
Discharge: HOME | End: 2022-03-30

## 2022-03-30 DIAGNOSIS — R10.84 GENERALIZED ABDOMINAL PAIN: ICD-10-CM

## 2022-03-30 DIAGNOSIS — Z11.59 NEED FOR HEPATITIS C SCREENING TEST: ICD-10-CM

## 2022-03-30 DIAGNOSIS — Z13.220 ENCOUNTER FOR LIPID SCREENING FOR CARDIOVASCULAR DISEASE: ICD-10-CM

## 2022-03-30 DIAGNOSIS — Z13.1 DIABETES MELLITUS SCREENING: ICD-10-CM

## 2022-03-30 DIAGNOSIS — Z13.6 ENCOUNTER FOR LIPID SCREENING FOR CARDIOVASCULAR DISEASE: ICD-10-CM

## 2022-03-30 DIAGNOSIS — E55.9 VITAMIN D DEFICIENCY: ICD-10-CM

## 2022-03-30 LAB
ABSOLUTE EOS #: 0.17 K/UL (ref 0–0.44)
ABSOLUTE IMMATURE GRANULOCYTE: 0.03 K/UL (ref 0–0.3)
ABSOLUTE LYMPH #: 2.71 K/UL (ref 1.1–3.7)
ABSOLUTE MONO #: 0.4 K/UL (ref 0.1–1.2)
ALBUMIN SERPL-MCNC: 4.4 G/DL (ref 3.5–5.2)
ALBUMIN/GLOBULIN RATIO: 1.8 (ref 1–2.5)
ALP BLD-CCNC: 78 U/L (ref 35–104)
ALT SERPL-CCNC: 21 U/L (ref 5–33)
ANION GAP SERPL CALCULATED.3IONS-SCNC: 8 MMOL/L (ref 9–17)
AST SERPL-CCNC: 14 U/L
BASOPHILS # BLD: 1 % (ref 0–2)
BASOPHILS ABSOLUTE: 0.04 K/UL (ref 0–0.2)
BILIRUB SERPL-MCNC: 0.18 MG/DL (ref 0.3–1.2)
BILIRUBIN URINE: NEGATIVE
BUN BLDV-MCNC: 9 MG/DL (ref 6–20)
CALCIUM SERPL-MCNC: 9.6 MG/DL (ref 8.6–10.4)
CHLORIDE BLD-SCNC: 105 MMOL/L (ref 98–107)
CHOLESTEROL/HDL RATIO: 2.3
CHOLESTEROL: 214 MG/DL
CO2: 25 MMOL/L (ref 20–31)
COLOR: YELLOW
COMMENT UA: NORMAL
CREAT SERPL-MCNC: 0.61 MG/DL (ref 0.5–0.9)
EOSINOPHILS RELATIVE PERCENT: 3 % (ref 1–4)
GFR AFRICAN AMERICAN: >60 ML/MIN
GFR NON-AFRICAN AMERICAN: >60 ML/MIN
GFR SERPL CREATININE-BSD FRML MDRD: ABNORMAL ML/MIN/{1.73_M2}
GLUCOSE BLD-MCNC: 108 MG/DL (ref 70–99)
GLUCOSE URINE: NEGATIVE
HCT VFR BLD CALC: 40.7 % (ref 36.3–47.1)
HDLC SERPL-MCNC: 92 MG/DL
HEMOGLOBIN: 12.8 G/DL (ref 11.9–15.1)
HEPATITIS C ANTIBODY: NONREACTIVE
IMMATURE GRANULOCYTES: 0 %
KETONES, URINE: NEGATIVE
LDL CHOLESTEROL: 107 MG/DL (ref 0–130)
LEUKOCYTE ESTERASE, URINE: NEGATIVE
LYMPHOCYTES # BLD: 40 % (ref 24–43)
MCH RBC QN AUTO: 27.9 PG (ref 25.2–33.5)
MCHC RBC AUTO-ENTMCNC: 31.4 G/DL (ref 28.4–34.8)
MCV RBC AUTO: 88.9 FL (ref 82.6–102.9)
MONOCYTES # BLD: 6 % (ref 3–12)
NITRITE, URINE: NEGATIVE
NRBC AUTOMATED: 0 PER 100 WBC
PDW BLD-RTO: 13.8 % (ref 11.8–14.4)
PH UA: 8 (ref 5–8)
PLATELET # BLD: 247 K/UL (ref 138–453)
PMV BLD AUTO: 10.7 FL (ref 8.1–13.5)
POTASSIUM SERPL-SCNC: 4.3 MMOL/L (ref 3.7–5.3)
PROTEIN UA: NEGATIVE
RBC # BLD: 4.58 M/UL (ref 3.95–5.11)
SEG NEUTROPHILS: 50 % (ref 36–65)
SEGMENTED NEUTROPHILS ABSOLUTE COUNT: 3.47 K/UL (ref 1.5–8.1)
SODIUM BLD-SCNC: 138 MMOL/L (ref 135–144)
SPECIFIC GRAVITY UA: 1.01 (ref 1–1.03)
TOTAL PROTEIN: 6.8 G/DL (ref 6.4–8.3)
TRIGL SERPL-MCNC: 76 MG/DL
TSH SERPL DL<=0.05 MIU/L-ACNC: 2.77 UIU/ML (ref 0.3–5)
TURBIDITY: CLEAR
URINE HGB: NEGATIVE
UROBILINOGEN, URINE: NORMAL
VITAMIN D 25-HYDROXY: 21.8 NG/ML
WBC # BLD: 6.8 K/UL (ref 3.5–11.3)

## 2022-07-21 DIAGNOSIS — Z12.31 ENCOUNTER FOR SCREENING MAMMOGRAM FOR MALIGNANT NEOPLASM OF BREAST: Primary | ICD-10-CM

## 2022-08-15 ENCOUNTER — HOSPITAL ENCOUNTER (OUTPATIENT)
Dept: MAMMOGRAPHY | Age: 57
Discharge: HOME | End: 2022-08-17
Payer: COMMERCIAL

## 2022-08-15 DIAGNOSIS — Z12.31 ENCOUNTER FOR SCREENING MAMMOGRAM FOR MALIGNANT NEOPLASM OF BREAST: ICD-10-CM

## 2022-08-15 PROCEDURE — 77063 BREAST TOMOSYNTHESIS BI: CPT

## 2022-08-25 ENCOUNTER — OFFICE VISIT (OUTPATIENT)
Dept: FAMILY MEDICINE CLINIC | Age: 57
End: 2022-08-25
Payer: COMMERCIAL

## 2022-08-25 VITALS
WEIGHT: 184.6 LBS | HEART RATE: 73 BPM | BODY MASS INDEX: 30.75 KG/M2 | OXYGEN SATURATION: 97 % | TEMPERATURE: 98.6 F | DIASTOLIC BLOOD PRESSURE: 71 MMHG | SYSTOLIC BLOOD PRESSURE: 139 MMHG | HEIGHT: 65 IN

## 2022-08-25 DIAGNOSIS — M25.512 ACUTE PAIN OF LEFT SHOULDER: Primary | ICD-10-CM

## 2022-08-25 DIAGNOSIS — N81.6 RECTOCELE: ICD-10-CM

## 2022-08-25 DIAGNOSIS — R20.2 NUMBNESS AND TINGLING IN LEFT ARM: ICD-10-CM

## 2022-08-25 DIAGNOSIS — R23.9 SKIN CHANGE: ICD-10-CM

## 2022-08-25 DIAGNOSIS — R45.7 EMOTIONAL STRESS: ICD-10-CM

## 2022-08-25 DIAGNOSIS — R20.0 NUMBNESS AND TINGLING IN LEFT ARM: ICD-10-CM

## 2022-08-25 PROCEDURE — G8417 CALC BMI ABV UP PARAM F/U: HCPCS | Performed by: FAMILY MEDICINE

## 2022-08-25 PROCEDURE — 99213 OFFICE O/P EST LOW 20 MIN: CPT | Performed by: FAMILY MEDICINE

## 2022-08-25 PROCEDURE — G8427 DOCREV CUR MEDS BY ELIG CLIN: HCPCS | Performed by: FAMILY MEDICINE

## 2022-08-25 PROCEDURE — 3017F COLORECTAL CA SCREEN DOC REV: CPT | Performed by: FAMILY MEDICINE

## 2022-08-25 PROCEDURE — 1036F TOBACCO NON-USER: CPT | Performed by: FAMILY MEDICINE

## 2022-08-25 ASSESSMENT — PATIENT HEALTH QUESTIONNAIRE - PHQ9
SUM OF ALL RESPONSES TO PHQ QUESTIONS 1-9: 3
SUM OF ALL RESPONSES TO PHQ QUESTIONS 1-9: 3
10. IF YOU CHECKED OFF ANY PROBLEMS, HOW DIFFICULT HAVE THESE PROBLEMS MADE IT FOR YOU TO DO YOUR WORK, TAKE CARE OF THINGS AT HOME, OR GET ALONG WITH OTHER PEOPLE: 0
7. TROUBLE CONCENTRATING ON THINGS, SUCH AS READING THE NEWSPAPER OR WATCHING TELEVISION: 0
2. FEELING DOWN, DEPRESSED OR HOPELESS: 3
6. FEELING BAD ABOUT YOURSELF - OR THAT YOU ARE A FAILURE OR HAVE LET YOURSELF OR YOUR FAMILY DOWN: 0
5. POOR APPETITE OR OVEREATING: 0
4. FEELING TIRED OR HAVING LITTLE ENERGY: 0
SUM OF ALL RESPONSES TO PHQ9 QUESTIONS 1 & 2: 3
SUM OF ALL RESPONSES TO PHQ QUESTIONS 1-9: 3
9. THOUGHTS THAT YOU WOULD BE BETTER OFF DEAD, OR OF HURTING YOURSELF: 0
8. MOVING OR SPEAKING SO SLOWLY THAT OTHER PEOPLE COULD HAVE NOTICED. OR THE OPPOSITE, BEING SO FIGETY OR RESTLESS THAT YOU HAVE BEEN MOVING AROUND A LOT MORE THAN USUAL: 0
1. LITTLE INTEREST OR PLEASURE IN DOING THINGS: 0
SUM OF ALL RESPONSES TO PHQ QUESTIONS 1-9: 3
3. TROUBLE FALLING OR STAYING ASLEEP: 0

## 2022-08-25 NOTE — PROGRESS NOTES
General FM note    Fili Bolanos is a 55 y.o. female who presents today for follow up on her  medical conditions as noted below.   Fili Bolanos is c/o of   Chief Complaint   Patient presents with    Hip Pain    Shoulder Pain     Left shoulder, cannot raise arm    Headache    Insect Bite     Month    Rectal Pain       Patient Active Problem List:     Cyst of skin and subcutaneous tissue     Neoplasm of uncertain behavior of skin     Abdominal pain     Cystocele     Rectocele     Inflamed epidermoid cyst of skin     Fibroid     H/O LEEP     Past Medical History:   Diagnosis Date    Abnormal Pap smear of cervix     Anxiety     Arthritis     NECK    Dental crowns present     UPPER ;AND LOWER    Depression     Difficult intravenous access     AVIOID RT HAND    Dizziness     R/T H-PYLORI    Dysphasia     states has cleared up    Ganglion cyst     LT WRIST. MID BACK    H pylori ulcer 2016    ABD-TREATED WITH ATB FEELING BETTER    HPV in female     Hypothyroidism     Nausea     R/T H PYLORI    Osteoarthritis     Rectal prolapse     SAB (spontaneous )     Shortness of breath     AT TIMES    Wears glasses     Wears glasses       Past Surgical History:   Procedure Laterality Date    APPENDECTOMY      COLONOSCOPY  2016    PT STATES NORMAL    CYST REMOVAL      LT WRIST GANGLION CYSTECTOMY    ENDOMETRIAL ABLATION      OTHER SURGICAL HISTORY  2015    EXCISION LESIONS MID BACK X2    OTHER SURGICAL HISTORY      pre cancer cells removed from cervix    OTHER SURGICAL HISTORY  10/31/2016    EXCISION SCALP LEISION X3    TUBAL LIGATION  1992    UPPER GASTROINTESTINAL ENDOSCOPY  2016    with biopsy    URETHRAL SURGERY N/A 2020    PUDENDAL BLOCK TRIGGER POINT WITH EXPERAL AND KENALOG performed by Angely Loera DO at [address] [place] [address]  16    vag A/P repair with cysto    VAGINA SURGERY  2020    PUDENDAL BLOCK TRIGGER POINT WITH EXPERAL AND KENALOG (N/A ) Family History   Problem Relation Age of Onset    Diabetes Mother     Hypertension Mother     Cancer Mother         KIDNEY    Other Mother         MENTAL HEALTH DISORDER    Arthritis Father         HIP REPLACEMENT    Other Sister         MENTAL DISORDER    Other Sister         FIBROMYALGIA, MENTAL HEALTH ISSUES    Arthritis Sister      Current Outpatient Medications   Medication Sig Dispense Refill    Tens Unit MISC by Does not apply route For Pain Management to L arm and L ankle    With necessary equipment (Patient not taking: No sig reported) 1 each 0    gabapentin (NEURONTIN) 100 MG capsule take 1 capsule by mouth twice a day 60 capsule 2    Misc. Devices MISC 1 each by Does not apply route once as needed (self guided deep tissue massage) Please dispense one theracane device. Massage back as tolerated to relieve muscle spasms. 1 Device 0    famotidine (PEPCID) 20 MG tablet Take 1 tablet by mouth 2 times daily (Patient not taking: No sig reported) 60 tablet 5    Turmeric POWD by Does not apply route (Patient not taking: No sig reported)      IODINE EX  (Patient not taking: No sig reported)       No current facility-administered medications for this visit. ALLERGIES:    Allergies   Allergen Reactions    Biaxin [Clarithromycin] Hives    Minocycline Hives    Prozac [Fluoxetine Hcl] Hives       Social History     Tobacco Use    Smoking status: Never    Smokeless tobacco: Never   Substance Use Topics    Alcohol use: Yes     Alcohol/week: 1.0 standard drink     Types: 1 Standard drinks or equivalent per week     Comment: BEER, WINE OR MIXED DRINKS 4 DRINKS A MONTH      Body mass index is 30.72 kg/m². /71   Pulse 73   Temp 98.6 °F (37 °C)   Ht 5' 5\" (1.651 m)   Wt 184 lb 9.6 oz (83.7 kg)   LMP 10/30/2016 Comment: negative. SpO2 97%   BMI 30.72 kg/m²     Subjective:      HPI    55 y.o. female coming today with multiple multiple complaints.   She states that she is not able to lift her left arm because of pain in her shoulder. She states this is pain that seems like comes from her neck shooting down but then the pain she has been having for years and years and nobody is doing anything. She states that she is not able to sleep on the left shoulder area and just started around 1 week ago. Any movement makes the pain worse and Tylenol has helped. The patient also wants to discuss other concerns including and really nobody ever is listening to her that she always has to repeat her complaints and coming into the office. The patient states that she can take a lot of pain. She states that she feels that somebody needs to do something. She says she really does not want to have any medication she does not want to take any medications but she cannot live like this and she feels that there is something very wrong. She states that she has 6 skin change and she would like to be checked out by dermatologist.  She also states that she has hemorrhoids which she has been dealing with for a long time but nobody has been taking care of it. So she would like to see a specialist.    Review of Systems   Constitutional: Negative for fever and unexpected weight change. Pertinent items are noted in HPI. Objective:   Physical Exam  Constitutional: VS (see above). General appearance: normal development, habitus and attention, no deformities. No distress. Eyes: normal conjunctiva and lids. Skin: no rashes, lesions or ulcers. Musculoskeletal: normal gait. Nails: no clubbing or cyanosis. The patient actually is able to stretch on the left arm on top and reaching overhead. I did not see any discomfort from patient is doing this but then again may be sleeping on it makes it worse. Psychiatric: alert and oriented to place, time and person. Normal mood and affect. Assessment:       Diagnosis Orders   1. Acute pain of left shoulder        2. Skin change  External Referral To Dermatology      3.  Numbness and tingling in left arm  XR CERVICAL SPINE (4-5 VIEWS)      4. Rectocele  CUBA Davis MD, Colorectal Surgery, [facility]      5. Emotional stress            Plan:   The patient always comes in with multiple multiple complaints. And she tells me that this all needs to be documented. She wants to and is open to see specialist but she does not want to take any medications. She states that she really does not know why she has to repeat everything all the time and she has all her paperwork with her every time. I feel her left shoulder pain is not very interrupting her during this encounter. The referrals were provided as requested. Return in about 6 months (around 2/25/2023), or if symptoms worsen or fail to improve. Orders Placed This Encounter   Procedures    XR CERVICAL SPINE (4-5 VIEWS)     Standing Status:   Future     Standing Expiration Date:   8/25/2023    External Referral To Dermatology     Referral Priority:   Routine     Referral Type:   Eval and Treat     Referral Reason:   Specialty Services Required     Referred to Provider:   Cameron Byrne MD     Requested Specialty:   Dermatology     Number of Visits Requested:   1    CUBA Davis MD, Colorectal Surgery, [facility]     Referral Priority:   Routine     Referral Type:   Eval and Treat     Referral Reason:   Specialty Services Required     Referred to Provider:   Donald Castañeda MD     Requested Specialty:   Colon and Rectal Surgery     Number of Visits Requested:   1     No orders of the defined types were placed in this encounter. Call or return to clinic prn if these symptoms worsen or fail to improve as anticipated. I have reviewed the instructions with the patient, answering all questions to patient's satisfaction. Zabrina Beckford received counseling on the following healthy behaviors: nutrition, exercise, and medication adherence  Reviewed prior labs and health maintenance. Continue current medications, diet and exercise.   Discussed use, benefit, and side effects of prescribed medications. Barriers to medication compliance addressed. Patient given educational materials - see patient instructions. All patient questions answered. Patient voiced understanding.       Electronically signed by Leif Marc MD on 8/29/2022 at 6:10 AM       (Please note that portions of this note were completed with a voice recognition program. Efforts were made to edit the dictations but occasionally words are mis-transcribed.)

## 2022-08-30 ENCOUNTER — HOSPITAL ENCOUNTER (OUTPATIENT)
Age: 57
Discharge: HOME | End: 2022-09-01
Payer: COMMERCIAL

## 2022-08-30 ENCOUNTER — HOSPITAL ENCOUNTER (OUTPATIENT)
Dept: GENERAL RADIOLOGY | Age: 57
Discharge: HOME | End: 2022-09-01
Payer: COMMERCIAL

## 2022-08-30 DIAGNOSIS — R20.0 NUMBNESS AND TINGLING IN LEFT ARM: ICD-10-CM

## 2022-08-30 DIAGNOSIS — R20.2 NUMBNESS AND TINGLING IN LEFT ARM: ICD-10-CM

## 2022-08-30 PROCEDURE — 72050 X-RAY EXAM NECK SPINE 4/5VWS: CPT

## 2022-09-01 ENCOUNTER — TELEPHONE (OUTPATIENT)
Dept: FAMILY MEDICINE CLINIC | Age: 57
End: 2022-09-01

## 2022-09-01 NOTE — TELEPHONE ENCOUNTER
Patient called in stating she was advise in recent xray results X-ray cervical spine did show degenerative changes most advanced at see 4 through C6. Await results of the EMG.   But she was never aware of completing a EMG and never received a order     She will like more information about this

## 2022-09-15 ENCOUNTER — OFFICE VISIT (OUTPATIENT)
Dept: FAMILY MEDICINE CLINIC | Age: 57
End: 2022-09-15
Payer: COMMERCIAL

## 2022-09-15 VITALS
BODY MASS INDEX: 31.28 KG/M2 | HEART RATE: 97 BPM | TEMPERATURE: 97.7 F | OXYGEN SATURATION: 97 % | WEIGHT: 188 LBS | SYSTOLIC BLOOD PRESSURE: 137 MMHG | DIASTOLIC BLOOD PRESSURE: 81 MMHG

## 2022-09-15 DIAGNOSIS — M54.16 CHRONIC RADICULAR LUMBAR PAIN: ICD-10-CM

## 2022-09-15 DIAGNOSIS — M50.30 DDD (DEGENERATIVE DISC DISEASE), CERVICAL: Primary | ICD-10-CM

## 2022-09-15 DIAGNOSIS — G89.29 CHRONIC RADICULAR LUMBAR PAIN: ICD-10-CM

## 2022-09-15 PROCEDURE — G8417 CALC BMI ABV UP PARAM F/U: HCPCS | Performed by: FAMILY MEDICINE

## 2022-09-15 PROCEDURE — 3017F COLORECTAL CA SCREEN DOC REV: CPT | Performed by: FAMILY MEDICINE

## 2022-09-15 PROCEDURE — 1036F TOBACCO NON-USER: CPT | Performed by: FAMILY MEDICINE

## 2022-09-15 PROCEDURE — G8427 DOCREV CUR MEDS BY ELIG CLIN: HCPCS | Performed by: FAMILY MEDICINE

## 2022-09-15 PROCEDURE — 99213 OFFICE O/P EST LOW 20 MIN: CPT | Performed by: FAMILY MEDICINE

## 2022-09-15 RX ORDER — VALACYCLOVIR HYDROCHLORIDE 1 G/1
TABLET, FILM COATED ORAL
COMMUNITY
Start: 2022-09-02

## 2022-09-15 NOTE — PROGRESS NOTES
General FM note    Matias Knowles is a 55 y.o. female who presents today for follow up on her  medical conditions as noted below.   Matias Knowles is c/o of   Chief Complaint   Patient presents with    Hip Pain     left    Neck Pain     Follow up       Patient Active Problem List:     Cyst of skin and subcutaneous tissue     Neoplasm of uncertain behavior of skin     Abdominal pain     Cystocele     Rectocele     Inflamed epidermoid cyst of skin     Fibroid     H/O LEEP     Past Medical History:   Diagnosis Date    Abnormal Pap smear of cervix     Anxiety     Arthritis     NECK    Dental crowns present     UPPER ;AND LOWER    Depression     Difficult intravenous access     AVIOID RT HAND    Dizziness     R/T H-PYLORI    Dysphasia     states has cleared up    Ganglion cyst     LT WRIST. MID BACK    H pylori ulcer 2016    ABD-TREATED WITH ATB FEELING BETTER    HPV in female     Hypothyroidism     Nausea     R/T H PYLORI    Osteoarthritis     Rectal prolapse     SAB (spontaneous )     Shortness of breath     AT TIMES    Wears glasses     Wears glasses       Past Surgical History:   Procedure Laterality Date    APPENDECTOMY      COLONOSCOPY  2016    PT STATES NORMAL    CYST REMOVAL      LT WRIST GANGLION CYSTECTOMY    ENDOMETRIAL ABLATION      OTHER SURGICAL HISTORY  2015    EXCISION LESIONS MID BACK X2    OTHER SURGICAL HISTORY      pre cancer cells removed from cervix    OTHER SURGICAL HISTORY  10/31/2016    EXCISION SCALP LEISION X3    TUBAL LIGATION  1992    UPPER GASTROINTESTINAL ENDOSCOPY  2016    with biopsy    URETHRAL SURGERY N/A 2020    PUDENDAL BLOCK TRIGGER POINT WITH EXPERAL AND KENALOG performed by David Landa DO at [address] [place] [address]  16    vag A/P repair with cysto    VAGINA SURGERY  2020    PUDENDAL BLOCK TRIGGER POINT WITH EXPERAL AND KENALOG (N/A )     Family History   Problem Relation Age of Onset    Diabetes Mother Hypertension Mother     Cancer Mother         KIDNEY    Other Mother         MENTAL HEALTH DISORDER    Arthritis Father         HIP REPLACEMENT    Other Sister         MENTAL DISORDER    Other Sister         FIBROMYALGIA, MENTAL HEALTH ISSUES    Arthritis Sister      Current Outpatient Medications   Medication Sig Dispense Refill    valACYclovir (VALTREX) 1 g tablet take 1 tablet by mouth once daily (Patient not taking: Reported on 9/15/2022)      Tens Unit MISC by Does not apply route For Pain Management to L arm and L ankle    With necessary equipment (Patient not taking: No sig reported) 1 each 0    gabapentin (NEURONTIN) 100 MG capsule take 1 capsule by mouth twice a day 60 capsule 2    Misc. Devices MISC 1 each by Does not apply route once as needed (self guided deep tissue massage) Please dispense one theracane device. Massage back as tolerated to relieve muscle spasms. 1 Device 0    famotidine (PEPCID) 20 MG tablet Take 1 tablet by mouth 2 times daily (Patient not taking: No sig reported) 60 tablet 5    Turmeric POWD by Does not apply route (Patient not taking: No sig reported)      IODINE EX  (Patient not taking: No sig reported)       No current facility-administered medications for this visit. ALLERGIES:    Allergies   Allergen Reactions    Biaxin [Clarithromycin] Hives    Minocycline Hives    Prozac [Fluoxetine Hcl] Hives       Social History     Tobacco Use    Smoking status: Never    Smokeless tobacco: Never   Substance Use Topics    Alcohol use: Yes     Alcohol/week: 1.0 standard drink     Types: 1 Standard drinks or equivalent per week     Comment: BEER, WINE OR MIXED DRINKS 4 DRINKS A MONTH      Body mass index is 31.28 kg/m². /81   Pulse 97   Temp 97.7 °F (36.5 °C)   Wt 188 lb (85.3 kg)   LMP 10/30/2016 Comment: negative. SpO2 97%   BMI 31.28 kg/m²     Subjective:      HPI    55 y.o. female coming today because of hip pain left as well as cramps at her left gluteal area.   She would like to discuss the x-ray of her cervical spine. She still states that she has problems of her left arm. Complains about left body side is very stiff. Constant pain cramp in her L glut, sharp pains a the inner thigh L. Radiating to her L lower extremity/ankle area. Went to dermatologist - pressure at her nose and cyst at her head. She tells me that he prescribed Valtrex which she tried for 1 week but it did not help any pain. The patient he did not address that discomfort or pain/continues skin t breaks at her nostrils. Skin breaks open at her nose and is very tender. Did see the colorectal spec. she was very pleased with him. She tells me that he does not eat her and he actually did examine her and in the area where she has always continues pain since she had surgery around 7 years ago/urogynecology. The specialist told her that he needs to request the records and we will go from there. Review of Systems   Constitutional: Negative for fever and unexpected weight change. Pertinent items are noted in HPI. Objective:   Physical Exam  Constitutional: VS (see above). General appearance: normal development, habitus and attention, no deformities. No distress. Eyes: normal conjunctiva and lids. Skin: no rashes, lesions or ulcers. Musculoskeletal: normal gait. Nails: no clubbing or cyanosis. Psychiatric: alert and oriented to place, time and person. Normal mood and affect. Assessment:       Diagnosis Orders   1. [facility]degenerative disc [facility]      2. Chronic radicular lumbar pain  XR LUMBAR SPINE (2-3 VIEWS)          Plan:   Hopefully the rehab specialist can help. X-ray of her lumbar spine ordered as the patient tells me she has a history of a bulging disc at her lower spine area. Return if symptoms worsen or fail to improve.   Orders Placed This Encounter   Procedures    XR LUMBAR SPINE (2-3 VIEWS)     Standing Status: Future     Standing Expiration Date:   9/15/2023    [facility], Feli Burnette     Referral Priority:   Routine     Referral Type:   Eval and Treat     Referral Reason:   Specialty Services Required     Requested Specialty:   Physical Medicine and Rehab     Number of Visits Requested:   1     No orders of the defined types were placed in this encounter. Call or return to clinic prn if these symptoms worsen or fail to improve as anticipated. I have reviewed the instructions with the patient, answering all questions to patient's satisfaction. Nicole Marrero received counseling on the following healthy behaviors: nutrition, exercise, and medication adherence  Reviewed prior labs and health maintenance. Continue current medications, diet and exercise. Discussed use, benefit, and side effects of prescribed medications. Barriers to medication compliance addressed. Patient given educational materials - see patient instructions. All patient questions answered. Patient voiced understanding.       Electronically signed by Di Hodgson MD on 9/15/2022 at 1:03 PM       (Please note that portions of this note were completed with a voice recognition program. Efforts were made to edit the dictations but occasionally words are mis-transcribed.)

## 2022-10-25 ENCOUNTER — TELEPHONE (OUTPATIENT)
Dept: OBGYN CLINIC | Age: 57
End: 2022-10-25

## 2022-10-25 NOTE — TELEPHONE ENCOUNTER
Pt would results from XR of Lumbar spine done on  09/15/2022 at [facility]. Please call patient with results .

## 2022-10-26 DIAGNOSIS — M54.16 CHRONIC RADICULAR LUMBAR PAIN: ICD-10-CM

## 2022-10-26 DIAGNOSIS — G89.29 CHRONIC RADICULAR LUMBAR PAIN: ICD-10-CM

## 2022-11-09 ENCOUNTER — HOSPITAL ENCOUNTER (OUTPATIENT)
Age: 57
Discharge: HOME | End: 2022-11-11
Payer: COMMERCIAL

## 2022-11-09 ENCOUNTER — HOSPITAL ENCOUNTER (OUTPATIENT)
Dept: GENERAL RADIOLOGY | Age: 57
Discharge: HOME | End: 2022-11-11
Payer: COMMERCIAL

## 2022-11-09 ENCOUNTER — OFFICE VISIT (OUTPATIENT)
Dept: PHYSICAL MEDICINE AND REHAB | Age: 57
End: 2022-11-09

## 2022-11-09 VITALS
BODY MASS INDEX: 31.22 KG/M2 | DIASTOLIC BLOOD PRESSURE: 89 MMHG | SYSTOLIC BLOOD PRESSURE: 168 MMHG | WEIGHT: 187.4 LBS | TEMPERATURE: 98.1 F | HEART RATE: 61 BPM | HEIGHT: 65 IN

## 2022-11-09 DIAGNOSIS — M75.82 ROTATOR CUFF TENDINITIS, LEFT: ICD-10-CM

## 2022-11-09 DIAGNOSIS — M47.816 DEGENERATIVE JOINT DISEASE OF CERVICAL AND LUMBAR SPINE: Primary | ICD-10-CM

## 2022-11-09 DIAGNOSIS — M47.812 DEGENERATIVE JOINT DISEASE OF CERVICAL AND LUMBAR SPINE: Primary | ICD-10-CM

## 2022-11-09 PROCEDURE — 73030 X-RAY EXAM OF SHOULDER: CPT

## 2022-11-09 RX ORDER — MELOXICAM 7.5 MG/1
7.5 TABLET ORAL DAILY
Qty: 90 TABLET | Refills: 1 | Status: SHIPPED | OUTPATIENT
Start: 2022-11-09

## 2022-11-09 RX ORDER — LIDOCAINE HYDROCHLORIDE 10 MG/ML
6 INJECTION, SOLUTION INFILTRATION; PERINEURAL ONCE
Status: COMPLETED | OUTPATIENT
Start: 2022-11-09 | End: 2022-11-09

## 2022-11-09 RX ADMIN — LIDOCAINE HYDROCHLORIDE 6 ML: 10 INJECTION, SOLUTION INFILTRATION; PERINEURAL at 10:01

## 2022-11-09 NOTE — PROGRESS NOTES
[facility]ICINE [facility]ILITATION  [address] [facility]  [place] [address]  Dept: [phone]  Dept Fax: [phone]    Outpatient Followup Note    Riley Ford, 65 y.o., female, presents for follow up c/o of Chronic Pain  . HPI:     HPI  Patient with chronic pain in multiple areas who is being seen in follow up today. She is interested in trigger point injections to to her neck/shoulder area today. She is receiving craniosacral massage from a massage therapist which provides some relief of her complaints. She has generalized aching pain in multiple areas including her neck and shoulder which are most significant areas today.      Past Medical History:   Diagnosis Date    Abnormal Pap smear of cervix     Anxiety     Arthritis     NECK    Dental crowns present     UPPER ;AND LOWER    Depression     Difficult intravenous access     AVIOID RT HAND    Dizziness     R/T H-PYLORI    Dysphasia     states has cleared up    Ganglion cyst     LT WRIST. MID BACK    H pylori ulcer 2016    ABD-TREATED WITH ATB FEELING BETTER    HPV in female     Hypothyroidism     Nausea     R/T H PYLORI    Osteoarthritis     Rectal prolapse     SAB (spontaneous )     Shortness of breath     AT TIMES    Wears glasses     Wears glasses       Past Surgical History:   Procedure Laterality Date    APPENDECTOMY      COLONOSCOPY  2016    PT STATES NORMAL    CYST REMOVAL      LT WRIST GANGLION CYSTECTOMY    ENDOMETRIAL ABLATION      OTHER SURGICAL HISTORY  2015    EXCISION LESIONS MID BACK X2    OTHER SURGICAL HISTORY      pre cancer cells removed from cervix    OTHER SURGICAL HISTORY  10/31/2016    EXCISION SCALP LEISION X3    TUBAL LIGATION      UPPER GASTROINTESTINAL ENDOSCOPY  2016    with biopsy    URETHRAL SURGERY N/A 2020    PUDENDAL BLOCK TRIGGER POINT WITH EXPERAL AND KENALOG performed by Amber Dubois DO at [facility] [place]    VAGINA SURGERY  6-13-16    vag A/P repair with cysto    VAGINA SURGERY  05/05/2020    PUDENDAL BLOCK TRIGGER POINT WITH EXPERAL AND KENALOG (N/A )     Family History   Problem Relation Age of Onset    Diabetes Mother     Hypertension Mother     Cancer Mother         KIDNEY    Other Mother         MENTAL HEALTH DISORDER    Arthritis Father         HIP REPLACEMENT    Other Sister         MENTAL DISORDER    Other Sister         FIBROMYALGIA, MENTAL HEALTH ISSUES    Arthritis Sister      Social History     Socioeconomic History    Marital status:    Tobacco Use    Smoking status: Never    Smokeless tobacco: Never   Vaping Use    Vaping Use: Never used   Substance and Sexual Activity    Alcohol use: Yes     Alcohol/week: 1.0 standard drink     Types: 1 Standard drinks or equivalent per week     Comment: BEER, WINE OR MIXED DRINKS 4 DRINKS A MONTH    Drug use: Yes     Types: Marijuana (Weed)     Comment: occasional    Sexual activity: Yes     Partners: Male     Social Determinants of Health     Financial Resource Strain: Low Risk     Difficulty of Paying Living Expenses: Not hard at all   Food Insecurity: No Food Insecurity    Worried About Running Out of Food in the Last Year: Never true    Ran Out of Food in the Last Year: Never true       Current Outpatient Medications   Medication Sig Dispense Refill    meloxicam (MOBIC) 7.5 MG tablet Take 1 tablet by mouth daily 90 tablet 1    gabapentin (NEURONTIN) 100 MG capsule take 1 capsule by mouth twice a day 60 capsule 2    Misc. Devices MISC 1 each by Does not apply route once as needed (self guided deep tissue massage) Please dispense one theracane device. Massage back as tolerated to relieve muscle spasms. 1 Device 0     No current facility-administered medications for this visit.      Allergies   Allergen Reactions    Biaxin [Clarithromycin] Hives    Minocycline Hives    Prozac [Fluoxetine Hcl] Hives       Subjective:      Review of Systems  Constitutional: Negative for fever, chills and unexpected weight change. HENT: Negative for trouble swallowing. Respiratory: Negative for cough and shortness of breath. Cardiovascular: Negative for chest pain. Endocrine: Negative for polyuria. Genitourinary: Negative for dysuria, urgency, frequency, incontinence and difficulty urinating. Gastrointestinal: Negative for constipation or diarrhea. Musculoskeletal: Positive for arthralgias. Neurological: Positive for headaches, numbness, or tingling. Psychiatric: Positive for depressed mood or anxiety. Objective:     Physical Exam  BP (!) 168/89   Pulse 61   Temp 98.1 °F (36.7 °C)   Ht 5' 5\" (1.651 m)   Wt 187 lb 6.4 oz (85 kg)   LMP 10/30/2016 Comment: negative. BMI 31.18 kg/m²   Constitutional: She appears well-developed and well-nourished. In no distress. HEENT: NCAT, PERRL, EOMI. Mucous membranes pink and moist.  Pulmonary/Chest: Respirations WNL and unlabored. MSK: Functional ROM cervical spine in all planes. Palpable trigger points and muscle tightness cervical paraspinal muscles and upper trapezius. .  Strength 5/5 key muscles BUEs. Functional ROM L shoulder impaired by pain. Tender to palpation but no palpable effusion. Neurological: She is alert and oriented to person, place, and time. DTRs 2+ and symmetric   Skin: Skin is warm dry and intact with good turgor. Psychiatric: She has a normal mood and affect. Her behavior is normal. Thought content normal.   Medical assistant note and vitals for today's encounter reviewed. Diagnostic Studies:     CERVICAL SPINE XRAY 8/30/22  FINDINGS:   There is mild straightening of cervical spine. No acute osseous abnormality. Advanced degenerative change most significant C4-5 C5-6 with loss disc height   endplate spondylosis. Uncovertebral joint spurring at these levels   encroaches neural foramina bilaterally greater on the right. Prevertebral   soft tissues unremarkable. Impression   No acute findings. Multilevel degenerative change. LUMBAR XRAY 9/15/22  Mild-moderate diffuse degenerative changes of the lumbosacral junction    PROCEDURE:  Trigger Point Procedural Note    Indication: Severe pain and pain control    Procedure: 12 Trigger Points were identified in the B upper trapezius muscles, and B cervical paraspinal muscles. The patient was placed in the appropriate position and the area over the trigger point was prepped with iodine and alcohol. Injection was performed into the trigger point area using 0.5 ml Lidocaine 1% into each of the 12 trigger point(s) followed by dry needling. The injection site was covered with a bandage. Complications: None, patient tolerated procedure well. Assessment:       Diagnosis Orders   1. Degenerative joint disease of cervical and lumbar spine  [facility]      2. Rotator cuff tendinitis, left  XR SHOULDER LEFT (MIN 2 VIEWS)    [facility]           Plan:      Trigger point injections as above. Can repeat in 6 weeks if needed. Will check xray L shoulder. Physical therapy to eval and treat.      Patient agreeable to trial of meloxicam.   Orders Placed This Encounter   Procedures    XR SHOULDER LEFT (MIN 2 VIEWS)     Standing Status:   Future     Number of Occurrences:   1     Standing Expiration Date:   11/9/2023     Order Specific Question:   Reason for exam:     Answer:   Impaired L shoulder ROM and pain worse with external rotation    [address]     Referral Priority:   Routine     Referral Type:   Eval and Treat     Referral Reason:   Specialty Services Required     Requested Specialty:   Physical Therapist     Number of Visits Requested:   1     Orders Placed This Encounter   Medications    lidocaine 1 % injection 6 mL    meloxicam (MOBIC) 7.5 MG tablet     Sig: Take 1 tablet by mouth daily     Dispense:  90 tablet     Refill:  1 Return in about 3 months (around 2/9/2023). Electronically signedby Malgorzata Landaverde MD on 12/7/2022 at 9:54 AM.     Please note that this chartwas generated using voice recognition Dragon dictation software. Although everyeffort was made to ensure the accuracy of this automated transcription, some errorsin transcription may have occurred.

## 2022-11-16 ENCOUNTER — HOSPITAL ENCOUNTER (OUTPATIENT)
Dept: PHYSICAL THERAPY | Age: 57
Setting detail: THERAPIES SERIES
Discharge: HOME | End: 2022-11-16
Payer: COMMERCIAL

## 2022-11-16 PROCEDURE — 97162 PT EVAL MOD COMPLEX 30 MIN: CPT

## 2022-11-16 NOTE — THERAPY EVALUATION
[] [address]  [place]: [phone]   F: [phone]    Physical Therapy General Evaluation    Date:  2022  Patient: Panfilo Lares  : 1965  MRN: [id number]  Physician: Rylee Art MD     Insurance: [facility] --  HARD [employer]/auth per [facility]   Medical Diagnosis:   K58.919, M47.816 (ICD-10-CM) - Degenerative joint disease of cervical and lumbar spine   M75.82 (ICD-10-CM) - Rotator cuff tendinitis, left       Rehab Codes: R25 pain , M25.60 stiffness, R53.1 weakness   Onset Date: 2 months ago                                  Next 's appt: 23- PMR       Subjective:   CC: L upper arm/shoulder pain    Pt arrives noting she has had multiple issues with pain in various areas of her body over the last 2 years. Upon much discussion her main problem appearing to be L shoulder pain that began about 2 months ago. She has seen her PCP  and PMR for this issue. Xrays for L shoulder were normal. She does have hx of cervical spine pain and issues that she has been getting trigger point injections for. Has not had for the L shoulder. Notes that she has been using a TENS and getting massages that help with pain management. She notes any fast movement at end ranges with the L shoulder are painful. Denies any previous injuries or surgeries.      Patient goals: to lessen pain in L shoulder        PMHx: [] Unremarkable [] Diabetes [] HTN  [] Pacemaker   [] MI/Heart Problems [] Cancer [] Arthritis [] Asthma                         [x] refer to full medical chart  In EPIC  [] Other:    Past Medical History:   Diagnosis Date    Abnormal Pap smear of cervix     Anxiety     Arthritis     NECK    Dental crowns present     UPPER ;AND LOWER    Depression     Difficult intravenous access     AVIOID RT HAND    Dizziness     R/T H-PYLORI    Dysphasia     states has cleared up    Ganglion cyst     LT WRIST. MID BACK    H pylori ulcer 2016 ABD-TREATED WITH ATB FEELING BETTER    HPV in female     Hypothyroidism     Nausea     R/T H PYLORI    Osteoarthritis     Rectal prolapse     SAB (spontaneous )     Shortness of breath     AT TIMES    Wears glasses     Wears glasses      Past Surgical History:   Procedure Laterality Date    APPENDECTOMY      COLONOSCOPY  2016    PT STATES NORMAL    CYST REMOVAL      LT WRIST GANGLION CYSTECTOMY    ENDOMETRIAL ABLATION      OTHER SURGICAL HISTORY  2015    EXCISION LESIONS MID BACK X2    OTHER SURGICAL HISTORY      pre cancer cells removed from cervix    OTHER SURGICAL HISTORY  10/31/2016    EXCISION SCALP LEISION X3    TUBAL LIGATION      UPPER GASTROINTESTINAL ENDOSCOPY  2016    with biopsy    URETHRAL SURGERY N/A 2020    PUDENDAL BLOCK TRIGGER POINT WITH EXPERAL AND KENALOG performed by Dolores Ramirez DO at [address] [place] [address]  16    vag A/P repair with cysto    VAGINA SURGERY  2020    PUDENDAL BLOCK TRIGGER POINT WITH EXPERAL AND KENALOG (N/A )           Comorbidities:   [x] Obesity [] Dialysis  [] Other:   [] Asthma/COPD [] Dementia [] Other:   [] Stroke [] Sleep apnea [] Other:   [] Vascular disease [] Rheumatic disease [] Other:     Tests/imaging:  CERVICAL SPINE XRAY 22  FINDINGS:   There is mild straightening of cervical spine. No acute osseous abnormality. Advanced degenerative change most significant C4-5 C5-6 with loss disc height   endplate spondylosis. Uncovertebral joint spurring at these levels   encroaches neural foramina bilaterally greater on the right. Prevertebral   soft tissues unremarkable. L shoulder Xray 22  FINDINGS:   Glenohumeral joint is normally aligned. No evidence of acute fracture or   dislocation. No abnormal periarticular calcifications. The [facility] joint is   unremarkable in appearance. Visualized lung is unremarkable.        Medications: [x] Refer to full medical record [] None [] Other:  Allergies:      [x] Refer to full medical record [] None [] Other:    Function:  Hand Dominance  [x] Right  [] Left      ADL/IADL Previous level of function  [] Independent in all except: Current level of function  [] Independent in all except:  Who currently assists the patient with task   Bathing  [x] Independent  [] Assist [] Independent  [x] Assist Increases pain    Reaching overhead  [x] Independent  [] Assist [] Independent  [x] Assist Increases pain    Dressing  [x] Independent  [] Assist [] Independent  [x] Assist Increases pain    Lifting/carrying [x] Independent  [] Assist [] Independent  [x] Assist  Feeling a weakness    Driving  [x] Independent  [] Assist [] Independent  [x] Assist Mainly using R arm      Gait Prior level of function Current level of function    [x] Independent  [] Assist [x] Independent  [] Assist     Pain:  [x] Yes  [] No Location: L shoulder at acromion and deltoid insertion  Pain Rating: (0-10 scale) 4/10  Pain altered Tx:  [] Yes  [] No  Action:  Comments:   - feeling a hot type burning in digits 4 & 5   - finds it hard to get comfortable due pain     Symptoms:  [x] Improving [] Worsening [] Same  Better: medication    Worse: quickly moving and reaching   Sleep: [] OK    [] Disturbed    Objective:      ROM  ° A/P STRENGTH    ROM    Left Right Left Right Cervical    Shoulder Flex 134 - pain inc here  170 3+ 5 Flexion 100%   Abd 130  170 4 5 Extension 100%   IR  To lateral hip  5 - pain on inner L arm  5      ER 30  4 5      Elbow Flex Full   5 5 Rotation L: 100% R: 100%    Ext Full   5 5 Sidebend L: good mobility R: tightness in L UT    Wrist Flex     Retraction    Ext         Hand          -- tightness of L upper trap & L levator scap   -- able to increase IR to sacrum when L scap stabilized and posture optimized  - special tests for L shoulder:   Painful arc: +   Empty can: +   Belly press: + pain  Joslyn reny: + pain     OBSERVATION No Deficit Deficit Not Tested Comments   Posture       Scapular   x  Medial border more prominent on L vs R    Forward Head [x] [] []    Rounded Shoulders [] [x] [] L more rounded forward vs R    Kyphosis [x] [] []    Lordosis [x] [] []    Joint mobility   x  Hypomobile in L shoulder posterior and inferior    Palpation [] [x] [] Pain with palpation at deltoid insertion   Pain with palpation at lateral acromion  TTP through subscap   Sensation [x] [] [] At times does note some burning in digits 4-5 on L but not consistent    Edema [x] [] []    Neurological [x] [] []    Comments:    Assessment:      Pt presents with L shoulder pain that is acute in onset and limits her reaching of the L arm. Based on presentation seems most consistent with RTC impingement symptoms. Pt also has hx of cervical symptoms and muscle tightness that could be influencing her L shoulder positioning and mechanics. Appears to be forward rounded and has decreased scapular setting/control that is contributing to abnormal mechanics (especially when reaching fast) that is causing pain. Pt does have a complex hx of multiple other medical issues and needs frequent redirection to remain focused on the L shoulder. Due to pain that fluctuates and present in multiple areas of the body can not rule out a central sensitization. Will need to modify interventions as needed to ensure pt is comfortable. Feel this Patient would benefit from skilled physical therapy services in order to: increased L shoulder joint mobility, improve tightness in the postural muscles, optimize posture, increase scapular control, and improve gross strength of L shoulder to improve ability to reach and use the L arm with ADLs. Problem list, as detailed above:   [x] ? Pain     [x] ? ROM    [x] ? Strength    [x] ? Function:   [x] Postural Deviations      STG: (to be met in 6 treatments)  Pt will report no greater than 2/10 upon arrival to sessions demonstrating less symptoms with ADLs.    Pt will demonstrate improved postural awareness with only minimal cues to correct posture throughout session to prevent undue stress to spine. Pt will improve L shoulder flexion and abductions to > 150 deg to demonstrate ability to reach overhead. Pt will increase L shoulder IR to at least L1 to be able to dress more efficiently. Pt will increase L shoulder ER to at least 50 deg to be able to improve ability to reach laterally. Pt will consistent be able to set L scapula with exercises to demonstrate improved mechanics for using the LUE. Pt will decreased functional limitation per SPADI to < 67% to demonstrate a meaningful change in symptoms and less dysfunction with ADLs. LTG: (to be met in 13 treatments)  Pt will be able to reach overhead without an increase in pain to be able to work in her kitchen. Pt will report < 2 days per week incidence of burning sensation into the L hand to improve tolerance to use arm with ADLs. Pt will increase strength of all major muscle groups of the UEs to 5/5 or greater demonstrating improved strength needed to maximize safety and efficiency with ADLs involving lifting and carrying. Pt will be independent with home program addressing strength, flexibility and balance to maximize gains made in therapy to improve overall functional capacity for mobility. Pt will decreased functional limitation per SPADI to < 57% to demonstrate a meaningful change in symptoms and less dysfunction with ADLs.          Functional Assessment Used: SPADI  Current Status- Score: 100/130 = 77% functional limtation   Goal Status- Score: 57% functional limitation     Evaluation Complexity:  History (Personal factors, comorbidities) [] 0 [] 1-2 [x] 3+   Exam (limitations, restrictions) [] 1-2 [x] 3 [] 4+   Clinical presentation (progression) [] Stable [x] Evolving  [] Unstable   Decision Making [] Low [x] Moderate [] High    [] Low Complexity [x] Moderate Complexity [] High Complexity     Rehab Potential:  [x] Good  [] Fair  [] Poor   Suggested Professional Referral:  [x] No  [] Yes:  Barriers to Goal Achievement[de-identified]  [x] No  [] Yes:  Domestic Concerns:  [x] No  [] Yes:    Pt. Education:  [x] Plans/Goals, Risks/Benefits discussed  [] Home exercise program  Method of Education: [x] Verbal  [x] Demo  [x] Written-- info about shoulder impingement   Comprehension of Education:  [x] Verbalizes understanding. [x] Demonstrates understanding. [x] Needs Review. [] Demonstrates/verbalizes understanding of HEP/Ed previously given.     Treatment Plan:  [x] Therapeutic Exercise   22646  [] Iontophoresis: 4 mg/mL Dexamethasone Sodium Phosphate  mAmin  80267   [x] Therapeutic Activity  48081 [x] Vasopneumatic cold with compression  47471    [] Gait Training   22564 [x] Ultrasound   17722   [x] Neuromuscular Re-education  89524 [x] Electrical Stimulation Unattended  94830   [x] Manual Therapy  34035 [] Electrical Stimulation Attended  90496   [] Instruction in HEP  [] Lumbar/Cervical Traction  85177   [] Aquatic Therapy   23499 [] Cold/hotpack    [] Massage   90197      [] Dry Needling, 1 or 2 muscles  61067   [] Biofeedback, first 15 minutes   90116  [] Biofeedback, additional 15 minutes   38122 [] Dry Needling, 3 or more muscles  82613     Frequency:  2 x/week for 12 visits (13 total visits = eval + 1)     Todays Treatment:  None this date due to [facility] ([place], [place])     Specific Instructions for next treatment: begin with manual work to the L shoulder, increase ROM as tolerated (pulleys), scap strengthening ( IYT), vaso if needed     Treatment Charges: Mins Units   [x] Evaluation       []  Low       [x]  Moderate       []  High 46 1   []  Modalities     []  Ther Exercise     []  Manual Therapy     []  Ther Activities     []  Aquatics     []  Neuromuscular     []  Gait Training     []  Dry Needling           1-2 muscles     []  Dry Needling           3 or more muscles     [] Vasocompression     []  Other       TOTAL TREATMENT TIME: 46      Time in:6;00p     Time out:6:46    Electronically signed by: Yoanna Saenz PT

## 2022-11-22 NOTE — PATIENT INSTRUCTIONS
Patient Education        Benign Paroxysmal Positional Vertigo (BPPV): Care Instructions  Your Care Instructions     Benign paroxysmal positional vertigo, also called BPPV, is an inner ear problem. It causes a spinning or whirling sensation when you move your head. This sensation is called vertigo. The vertigo usually lasts for less than a minute. People often have vertigo spells for a few days or weeks. Then the vertigo goes away. But it may come back again. The vertigo may be mild, or it may be bad enough to cause unsteadiness, nausea, and vomiting. When you move, your inner ear sends messages to the brain. This helps you keep your balance. Vertigo can happen when debris builds up in the inner ear. The buildup can cause the inner ear to send the wrong message to the brain. Your doctor may move you in different positions to help your vertigo get better faster. This is called the Epley maneuver. Your doctor may also prescribe medicines or exercises to help with your symptoms. Follow-up care is a key part of your treatment and safety. Be sure to make and go to all appointments, and call your doctor if you are having problems. It's also a good idea to know your test results and keep a list of the medicines you take. How can you care for yourself at home? · If your doctor suggests that you do Cm-Daroff exercises:  ? Sit on the edge of a bed or sofa. Quickly lie down on the side that causes the worst vertigo. Lie on your side with your ear down. ? Stay in this position for at least 30 seconds or until the vertigo goes away. ? Sit up. If this causes vertigo, wait for it to stop. ? Repeat the procedure on the other side. ? Repeat this 10 times. Do these exercises 2 times a day until the vertigo is gone. When should you call for help? [id number] anytime you think you may need emergency care. For example, call if:  · You have symptoms of a stroke.  These may include:  ? Sudden numbness, tingling, weakness, or The pyloric dilatation may provide some symptom relief, but perhaps not as much as she would get with the botox since that is what she received last procedure.    Unfortunately the most updated research based practice guidelines on gastroparesis from 2022 no longer recommend botox.  This was based on two randomized control trials. However, the botox does decrease pyloric distensibility which is associated with symptom improvement, improved QOL and enhanced gastric emptying, so it is still technically included in the treatment algorithm.     I would be willing to do a [id number] for appeal if this can be arranged. Especially in setting that this patient experienced some symptom relief following last procedure.     If still denied, would need to consider next steps - surgery. Either gastric stimulator implant or pyloromyotomy (an incision in the wall of the pylorus which relieves tension) which is recommended for patients with symptoms refractory to medical therapy over no treatment for symptom control.     loss of movement in your face, arm, or leg, especially on only one side of your body. ? Sudden vision changes. ? Sudden trouble speaking. ? Sudden confusion or trouble understanding simple statements. ? Sudden problems with walking or balance. ? A sudden, severe headache that is different from past headaches. Call your doctor now or seek immediate medical care if:  · You have new or worse nausea and vomiting. · You have new symptoms such as hearing loss or roaring in your ears. Watch closely for changes in your health, and be sure to contact your doctor if:  · You are not getting better as expected. · Your vertigo gets worse. Where can you learn more? Go to https://Root [email or web address].[employer]. org and sign in to your [employer] account. Enter  in the [employer] box to learn more about \"Benign Paroxysmal Positional Vertigo (BPPV): Care Instructions. \"     If you do not have an account, please click on the \"Sign Up Now\" link. Current as of: July 29, 2019               Content Version: 12.5  © 1398-6611 [email or web address]. Care instructions adapted under license by [facility] ([facility]). If you have questions about a medical condition or this instruction, always ask your healthcare professional. [place] [address] any warranty or liability for your use of this information. Patient Education        C[place]orne Exercises for Vertigo: Care Instructions  Your Care Instructions  Simple exercises can help you regain your balance when you have vertigo. If you have Ménière's disease, benign paroxysmal positional vertigo (BPPV), or another inner ear problem, you may have vertigo off and on. Do these exercises first thing in the morning and before you go to bed. You might get dizzy when you first start them. If this happens, try to do them for at least 5 minutes.  Do a group of exercises at a time, starting at the top of the list. It may take several weeks before you can do all the exercises without feeling dizzy. Follow-up care is a key part of your treatment and safety. Be sure to make and go to all appointments, and call your doctor if you are having problems. It's also a good idea to know your test results and keep a list of the medicines you take. How can you care for yourself at home? Exercise 1  While sitting on the side of the bed and holding your head still:  · Look up as far as you can. · Look down as far as you can. · Look from side to side as far as you can. · Stretch your arm straight out in front of you. Focus on your index finger. Continue to focus on your finger while you bring it to your nose. Exercise 2  While sitting on the side of the bed:  · Bring your head as far back as you can. · Bring your head forward to touch your chin to your chest.  · Turn your head from side to side. · Do these exercises first with your eyes open. Then try with your eyes closed. Exercise 3  While sitting on the side of the bed:  · Shrug your shoulders straight upward, then relax them. · Bend over and try to touch the ground with your fingers. Then go back to a sitting position. · Toss a small ball from one hand to the other. Throw the ball higher than your eyes so you have to look up. Exercise 4  While standing (with someone close by if you feel uncomfortable):  · Repeat Exercise 1.  · Repeat Exercise 2.  · Pass a ball between your legs and above your head. · Sit down and then stand up. Repeat. Turn around in a [place] a different way each time you stand. · With someone close by to help you, try the above exercises with your eyes closed. Exercise 5  In a room that is cleared of obstacles:  · Walk to a corner of the room, turn to your right, and walk back to the starting point. Now, repeat and turn left. · Walk up and down a slope. Now try stairs. · While holding on to someone's arm, try these exercises with your eyes closed. When should you call for help?   Watch closely for changes in your health, and be sure to contact your doctor if:  · You do not get better as expected. Where can you learn more? Go to https://chpepiceweb.[email or web address]. org and sign in to your [employer] account. Enter F332 in the [employer] box to learn more about \"Cawthorne Exercises for Vertigo: Care Instructions. \"     If you do not have an account, please click on the \"Sign Up Now\" link. Current as of: July 29, 2019               Content Version: 12.5  © 8086-8951 [email or web address]. Care instructions adapted under license by [employer]. If you have questions about a medical condition or this instruction, always ask your healthcare professional. [address] any warranty or liability for your use of this information.

## 2022-11-30 ENCOUNTER — HOSPITAL ENCOUNTER (OUTPATIENT)
Dept: PHYSICAL THERAPY | Age: 57
Setting detail: THERAPIES SERIES
Discharge: HOME | End: 2022-11-30
Payer: COMMERCIAL

## 2022-11-30 PROCEDURE — 97140 MANUAL THERAPY 1/> REGIONS: CPT

## 2022-11-30 PROCEDURE — 97110 THERAPEUTIC EXERCISES: CPT

## 2022-11-30 NOTE — FLOWSHEET NOTE
[facility]   [address]   Phone: [phone]   Fax: [phone]    Physical Therapy Daily Treatment Note      Date:  2022  Patient Name:  Dianne Clark    :  1965  MRN: [id number]  Physician: Radha Soliz MD                                 Insurance: [facility] --  HARD [employer]/auth per caresource -- [employer] per caresource deferred to [address] [place] [address] Diagnosis:   B78.621, M47.816 (ICD-10-CM) - Degenerative joint disease of cervical and lumbar spine   M75.82 (ICD-10-CM) - Rotator cuff tendinitis, left                             Rehab Codes: R25 pain , M25.60 stiffness, R53.1 weakness   Onset Date: 2 months ago                                  Next 's appt: 23- PMR   Visit# / total visits:   Cancels/No Shows: 0/0    Precautions: high anxiety,     Subjective:  Pt arrives ~14 min late to session-- leads to shortened session. She notes her L shoulder is feeling about the same since eval. She feels her pain increases with movement to 8-10/10, but more a 2/10 at rest. Pain still bothering her when sleeping noting at least a 5/10 pain.        Pain:  [x] Yes  [] No Location: L shoulder Pain Rating: (0-10 scale) 8-10/10 with movement   Pain altered Tx:  [] No  [] Yes  Action:  Comments:    Objective:  INTERVENTIONS  INTERVENTIONS  Reps/ Time Weight/ Level Completed  Today Comments          MODALITIES                      MANUAL -  L shoulder 11 min TOTAL      STM to anterior shoulder/pec 3'  x    Distraction  3'  x    PA & Inf  L GH jt mobs  5'  x                         EXERCISES        UBE: F/B  2/2 min  x    AAROM Flexion 15x  x    AAROM ABD 15x  x Cues to keep in pain free range    AAROM ER  15x  X    Supine arm at 90 deg - circles CW/CCW 20x ea   x Cues to keep in pain free range    Supine serratus punches  2x10  x Guided 1st set to ensure understanding    Supine B ER pull apart  2x10  Yellow  x           Other:    Specific Instructions for next treatment begin with manual work to the L shoulder, increase ROM as tolerated (pulleys), scap strengthening ( IYT), vaso if needed        Assessment: [x] Progressing toward goals. Completed session in limited time due to late arrival. Focused on increasing ROM in pain free range with UBE and AAROM. Pt still very guarded with L shoulder ABD. Pt responds well to manual work feeling that she can do more with the LUE. Provided pt with initial HEP to have her begin progressing her ROM. Frequent cues needed to redirect and focus on the L shoulder as pt to tends to get anxious and not unrelated pain. [] No change. [] Other:    [x] Patient would continue to benefit from skilled physical therapy services in order to:  increase L shoulder joint mobility, improve tightness in the postural muscles, optimize posture, increase scapular control, and improve gross strength of L shoulder to improve ability to reach and use the L arm with    GOALS:   STG: (to be met in 6 treatments)  Pt will report no greater than 2/10 upon arrival to sessions demonstrating less symptoms with ADLs. Pt will demonstrate improved postural awareness with only minimal cues to correct posture throughout session to prevent undue stress to spine. Pt will improve L shoulder flexion and abductions to > 150 deg to demonstrate ability to reach overhead. Pt will increase L shoulder IR to at least L1 to be able to dress more efficiently. Pt will increase L shoulder ER to at least 50 deg to be able to improve ability to reach laterally. Pt will consistent be able to set L scapula with exercises to demonstrate improved mechanics for using the LUE. Pt will decreased functional limitation per SPADI to < 67% to demonstrate a meaningful change in symptoms and less dysfunction with ADLs. LTG: (to be met in 13 treatments)  Pt will be able to reach overhead without an increase in pain to be able to work in her kitchen.    Pt will report < 2 days per week incidence of burning sensation into the L hand to improve tolerance to use arm with ADLs. Pt will increase strength of all major muscle groups of the UEs to 5/5 or greater demonstrating improved strength needed to maximize safety and efficiency with ADLs involving lifting and carrying. Pt will be independent with home program addressing strength, flexibility and balance to maximize gains made in therapy to improve overall functional capacity for mobility. Pt will decreased functional limitation per SPADI to < 57% to demonstrate a meaningful change in symptoms and less dysfunction with ADLs. Pt. Education:  [x] Yes  [] No  [x] Reviewed Prior HEP/Ed  Method of Education: [x] Verbal  [] Demo  [x] Written  Comprehension of Education:  [x] Verbalizes understanding. [x] Demonstrates understanding. [x] Needs review. [] Demonstrates/verbalizes HEP/Ed previously given. HEP:   AAROM FLEX   AAROM ER   Supine arm circles      Plan: [x] Continue per plan of care.    [] Other:      Treatment Charges: Mins Units   []  Modalities     [x]  Ther Exercise 20 1   [x]  Manual Therapy 11 1   []  Ther Activities     []  Aquatics     []  Neuromuscular     [] Vasocompression     [] Gait Training     [] Dry needling        [] 1 or 2 muscles        [] 3 or more muscles     []  Other     Total Treatment time 31 2     Time II:8182            Time Out: 9368    Electronically signed by:  Aarti Rizzo PT

## 2022-12-05 ENCOUNTER — HOSPITAL ENCOUNTER (OUTPATIENT)
Dept: PHYSICAL THERAPY | Age: 57
Setting detail: THERAPIES SERIES
Discharge: HOME | End: 2022-12-05
Payer: COMMERCIAL

## 2022-12-05 PROCEDURE — 97110 THERAPEUTIC EXERCISES: CPT

## 2022-12-05 PROCEDURE — 97140 MANUAL THERAPY 1/> REGIONS: CPT

## 2022-12-05 NOTE — FLOWSHEET NOTE
[facility]   [address] [facility] [address]   Phone: [phone]   Fax: [phone]    Physical Therapy Daily Treatment Note      Date:  2022  Patient Name:  Roxy Quach    :  1965  MRN: [id number]  Physician: Maynor Padilla MD                                 Insurance: [facility] --  HARD [employer]/auth per caresource -- Lore Royalty per caresource deferred to [address] [place] [address] Diagnosis:   V89.697, M47.816 (ICD-10-CM) - Degenerative joint disease of cervical and lumbar spine   M75.82 (ICD-10-CM) - Rotator cuff tendinitis, left                             Rehab Codes: R25 pain , M25.60 stiffness, R53.1 weakness   Onset Date: 2 months ago                                  Next 's appt: 23- PMR   Visit# / total visits: 3/13  Cancels/No Shows: 0/0    Precautions: high anxiety,     Subjective:  Pt reports pain still about the same with movements but felt better/looser after last visit.         Pain:  [x] Yes  [] No Location: L shoulder Pain Rating: (0-10 scale) 8-10/10 with movement   Pain altered Tx:  [x] No  [] Yes  Action:  Comments:    Objective:  INTERVENTIONS  INTERVENTIONS  Reps/ Time Weight/ Level Completed  Today Comments          MODALITIES                      MANUAL -  L shoulder 15 min TOTAL      STM to anterior shoulder/pec 5'  x    Distraction  3'  x    PA & Inf  L GH jt mobs  7'  x                         EXERCISES        UBE: F/B  2/2 min  x    AAROM Flexion 15x  x    AAROM ABD 15x  x Cues to keep in pain free range    AAROM ER  15x      Supine arm at 90 deg - circles CW/CCW 20x ea   x Cues to keep in pain free range    Supine serratus punches  2x10  x Guided 1st set to ensure understanding    Supine B ER pull apart  2x10  Yellow      Prone scap depression 10x  x    Prone scap retractions 10x  x    Prone ITY 10x  x Only ably to perform I T    Prone extension 10x  x    Other:    Specific Instructions for next treatment begin with manual work to the L shoulder, increase ROM as tolerated (pulleys), scap strengthening ( IYT), vaso if needed        Assessment: [x] Progressing toward goals. Continued with manual to left anterior shoulder to decrease tightness and improve pain free mobility. Notes improved mobility after manual and since last visit. Added prone scapular strengthening exercises. Tolerated all well but needed redirecting to stay on task and count reps. Unable to perform Prone \"T\" due to pain. Notes will ice at home today. [] No change. [] Other:    [x] Patient would continue to benefit from skilled physical therapy services in order to:  increase L shoulder joint mobility, improve tightness in the postural muscles, optimize posture, increase scapular control, and improve gross strength of L shoulder to improve ability to reach and use the L arm with ADLs. GOALS:   STG: (to be met in 6 treatments)  Pt will report no greater than 2/10 upon arrival to sessions demonstrating less symptoms with ADLs. Pt will demonstrate improved postural awareness with only minimal cues to correct posture throughout session to prevent undue stress to spine. Pt will improve L shoulder flexion and abductions to > 150 deg to demonstrate ability to reach overhead. Pt will increase L shoulder IR to at least L1 to be able to dress more efficiently. Pt will increase L shoulder ER to at least 50 deg to be able to improve ability to reach laterally. Pt will consistent be able to set L scapula with exercises to demonstrate improved mechanics for using the LUE. Pt will decreased functional limitation per SPADI to < 67% to demonstrate a meaningful change in symptoms and less dysfunction with ADLs. LTG: (to be met in 13 treatments)  Pt will be able to reach overhead without an increase in pain to be able to work in her kitchen.    Pt will report < 2 days per week incidence of burning sensation into the L hand to improve tolerance to use arm with ADLs.  Pt will increase strength of all major muscle groups of the UEs to 5/5 or greater demonstrating improved strength needed to maximize safety and efficiency with ADLs involving lifting and carrying. Pt will be independent with home program addressing strength, flexibility and balance to maximize gains made in therapy to improve overall functional capacity for mobility. Pt will decreased functional limitation per SPADI to < 57% to demonstrate a meaningful change in symptoms and less dysfunction with ADLs. Pt. Education:  [x] Yes  [] No  [x] Reviewed Prior HEP/Ed  Method of Education: [x] Verbal  [] Demo  [x] Written  Comprehension of Education:  [x] Verbalizes understanding. [x] Demonstrates understanding. [x] Needs review. [] Demonstrates/verbalizes HEP/Ed previously given. HEP:   AAROM FLEX   AAROM ER   Supine arm circles      Plan: [x] Continue per plan of care.    [] Other:      Treatment Charges: Mins Units   []  Modalities     [x]  Ther Exercise 31 1   [x]  Manual Therapy 15 1   []  Ther Activities     []  Aquatics     []  Neuromuscular     [] Vasocompression     [] Gait Training     [] Dry needling        [] 1 or 2 muscles        [] 3 or more muscles     []  Other     Total Treatment time 46 2     Time In: 1430            Time Out: 0180    Electronically signed by:  Georgiana Boyd PTA

## 2022-12-07 ENCOUNTER — HOSPITAL ENCOUNTER (OUTPATIENT)
Dept: PHYSICAL THERAPY | Age: 57
Setting detail: THERAPIES SERIES
Discharge: HOME | End: 2022-12-07
Payer: COMMERCIAL

## 2022-12-07 PROCEDURE — 97110 THERAPEUTIC EXERCISES: CPT

## 2022-12-07 PROCEDURE — 97140 MANUAL THERAPY 1/> REGIONS: CPT

## 2022-12-07 NOTE — FLOWSHEET NOTE
[facility]   [address]   Phone: [phone]   Fax: [phone]    Physical Therapy Daily Treatment Note      Date:  2022  Patient Name:  Oskar Knox    :  1965  MRN: [id number]  Physician: Jg Ham MD                                 Insurance: [facility] --  [facility] to [address] [place] [address] Diagnosis:   Z00.455, M47.816 (ICD-10-CM) - Degenerative joint disease of cervical and lumbar spine   M75.82 (ICD-10-CM) - Rotator cuff tendinitis, left                             Rehab Codes: R25 pain , M25.60 stiffness, R53.1 weakness   Onset Date: 2 months ago                                  Next 's appt: 23- PMR   Visit# / total visits:   Cancels/No Shows: 0/0    Precautions: high anxiety,     Subjective:  Pt reports not much change in pain at this time. States felt better after last visit but still very tight and painful with those certain movements.       Pain:  [x] Yes  [] No Location: L shoulder Pain Rating: (0-10 scale) 8-10/10 with movement   Pain altered Tx:  [x] No  [] Yes  Action:  Comments:    Objective:  INTERVENTIONS  INTERVENTIONS  Reps/ Time Weight/ Level Completed  Today Comments          MODALITIES                      MANUAL -  L shoulder 15 min TOTAL      STM to anterior shoulder/pec 5'  x    Distraction  3'  x    PA & Inf  L GH jt mobs  7'  x                         EXERCISES        UBE: F/B  2/2 min  x    AAROM Flexion 15x  x    AAROM ABD 15x  x Cues to keep in pain free range    AAROM ER  15x      Supine arm at 90 deg - circles CW/CCW 20x ea   x Cues to keep in pain free range    Supine serratus punches  2x10  x Guided 1st set to ensure understanding    Supine B ER pull apart  2x10  Yellow      supine scap depression 10x  x    Prone scap retractions 10x  x    Prone ITY 10x  x Only ably to perform I T    Prone extension 10x  x    Other:    Specific Instructions for next treatment begin with manual work to the L shoulder, increase ROM as tolerated (pulleys), scap strengthening ( IYT), vaso if needed        Assessment: [x] Progressing toward goals. Continued with [facility] joint mobs for improved pain free mobility. States less pain and tightness in shoulder but did require 1 rest break when performing manual to pec minor due to increased pain/discomfort. Intermittent pain with AAROM flexion exercise into Biceps groove/lateral shoulder. Continued with difficulty relaxing when performing Manual today. [] No change. [] Other:    [x] Patient would continue to benefit from skilled physical therapy services in order to:  increase L shoulder joint mobility, improve tightness in the postural muscles, optimize posture, increase scapular control, and improve gross strength of L shoulder to improve ability to reach and use the L arm with ADLs. GOALS:   STG: (to be met in 6 treatments)  Pt will report no greater than 2/10 upon arrival to sessions demonstrating less symptoms with ADLs. Pt will demonstrate improved postural awareness with only minimal cues to correct posture throughout session to prevent undue stress to spine. Pt will improve L shoulder flexion and abductions to > 150 deg to demonstrate ability to reach overhead. Pt will increase L shoulder IR to at least L1 to be able to dress more efficiently. Pt will increase L shoulder ER to at least 50 deg to be able to improve ability to reach laterally. Pt will consistent be able to set L scapula with exercises to demonstrate improved mechanics for using the LUE. Pt will decreased functional limitation per SPADI to < 67% to demonstrate a meaningful change in symptoms and less dysfunction with ADLs. LTG: (to be met in 13 treatments)  Pt will be able to reach overhead without an increase in pain to be able to work in her kitchen.    Pt will report < 2 days per week incidence of burning sensation into the L hand to improve tolerance to use arm with ADLs. Pt will increase strength of all major muscle groups of the UEs to 5/5 or greater demonstrating improved strength needed to maximize safety and efficiency with ADLs involving lifting and carrying. Pt will be independent with home program addressing strength, flexibility and balance to maximize gains made in therapy to improve overall functional capacity for mobility. Pt will decreased functional limitation per SPADI to < 57% to demonstrate a meaningful change in symptoms and less dysfunction with ADLs. Pt. Education:  [x] Yes  [] No  [x] Reviewed Prior HEP/Ed  Method of Education: [x] Verbal  [] Demo  [x] Written  Comprehension of Education:  [x] Verbalizes understanding. [x] Demonstrates understanding. [x] Needs review. [] Demonstrates/verbalizes HEP/Ed previously given. HEP:   AAROM FLEX   AAROM ER   Supine arm circles      Plan: [x] Continue per plan of care.    [] Other:      Treatment Charges: Mins Units   []  Modalities     [x]  Ther Exercise 30 2   [x]  Manual Therapy 10 1   []  Ther Activities     []  Aquatics     []  Neuromuscular     [] Vasocompression     [] Gait Training     [] Dry needling        [] 1 or 2 muscles        [] 3 or more muscles     []  Other     Total Treatment time 40 3     Time In: 6214            Time Out: 7320    Electronically signed by:  Shari Gipson PTA

## 2022-12-12 ENCOUNTER — HOSPITAL ENCOUNTER (OUTPATIENT)
Dept: PHYSICAL THERAPY | Age: 57
Setting detail: THERAPIES SERIES
Discharge: HOME | End: 2022-12-12
Payer: COMMERCIAL

## 2022-12-12 PROCEDURE — 97110 THERAPEUTIC EXERCISES: CPT

## 2022-12-12 PROCEDURE — 97016 VASOPNEUMATIC DEVICE THERAPY: CPT

## 2022-12-12 NOTE — FLOWSHEET NOTE
[facility]   [address]   Phone: [phone]   Fax: [phone]    Physical Therapy Daily Treatment Note      Date:  2022  Patient Name:  Dayanara Garcia    :  1965  MRN: [id number]  Physician: Michael Blackburn MD                                 Insurance: [facility] --  [facility] [address] [facility] deferred to [address] [place] [address] Diagnosis:   B05.096, M47.816 (ICD-10-CM) - Degenerative joint disease of cervical and lumbar spine   M75.82 (ICD-10-CM) - Rotator cuff tendinitis, left                             Rehab Codes: R25 pain , M25.60 stiffness, R53.1 weakness   Onset Date: 2 months ago                                  Next 's appt: 23- PMR   Visit# / total visits:   Cancels/No Shows: 0/0    Precautions: high anxiety,     Subjective:  Pt reports she is still having pain in the L shoulder that is a \"grabbing\" type pain with fast movements. Feels she is able to reach further before that pain occur. Still the same intensity but less frequent. Feels after last session she had increased pain after supine arm work. Would like to avoid this session.      Pain:  [x] Yes  [] No Location: L shoulder Pain Rating: (0-10 scale) 8-10/10 with movement   Pain altered Tx:  [x] No  [] Yes  Action:  Comments:    Objective:  INTERVENTIONS  INTERVENTIONS  Reps/ Time Weight/ Level Completed  Today Comments          MODALITIES        Vasocompression -- L shoulder  10 min  34 deg  x           MANUAL -  L shoulder 10 min TOTAL      STM to anterior shoulder/pec 5'  x    Distraction  3'      PA & Inf  L GH jt mobs  5'  x                         EXERCISES        UBE: F/B  2/2 min Level 2 x Cues to keep elbows closer to trunk to reduce shoulder pain    AAROM Flexion 15x  x    AAROM ABD   x Limited understanding so stopped    PROM D2 flexion  15x  x    PROM ER @ 45 deg  15x  x    Posterior shoulder stretch  2x30s   x Tried supine but tolerates best sitting    Supine arm at 90 deg - circles CW/CCW 20x ea    Cues to keep in pain free range    Supine serratus punches  2x10   Guided 1st set to ensure understanding    Supine B ER pull apart  2x10  Yellow      supine scap depression 10x      Prone scap retractions 10x      Prone ITY 10x   Only ably to perform I T    Prone extension 10x             Standing        W ball rolls  10x ea   x Yellow ball on wall    TB straight arm pull downs 20x Red x    TB rows  20x Red  x    TB horizontal Abd  20x  Red  x    Other:    Specific Instructions for next treatment begin with manual work to the L shoulder, increase ROM as tolerated (pulleys/ball on wall), scap strengthening ( IYT), vaso if needed  -- avoid arm at 90 deg movements       Assessment: [x] Progressing toward goals. Pt still having sharp pains with fast movements of the L arm away from the body. She is noting pain all throughout the LUE but not consistent. Discussed taking things to tolerance and keeping even breaths to ensure relaxation. Had to stop AAROM as pt not understanding and added passive D2 patterns to maximize range. Pt still guarding which limits mobility and why manual performed. Continued to modify as needed to ensure comfort without sharp pain. Held from exercises that increased pain and added more postural/scapular strengthening upright. Pt gets better active flexion with ball rolls with no increase in pain. To prevent sharp pain post session completed vaso as this improves pain. [] No change. [] Other:    [x] Patient would continue to benefit from skilled physical therapy services in order to:  increase L shoulder joint mobility, improve tightness in the postural muscles, optimize posture, increase scapular control, and improve gross strength of L shoulder to improve ability to reach and use the L arm with ADLs.     GOALS:   STG: (to be met in 6 treatments)  Pt will report no greater than 2/10 upon arrival to sessions demonstrating less symptoms with ADLs. Pt will demonstrate improved postural awareness with only minimal cues to correct posture throughout session to prevent undue stress to spine. Pt will improve L shoulder flexion and abductions to > 150 deg to demonstrate ability to reach overhead. Pt will increase L shoulder IR to at least L1 to be able to dress more efficiently. Pt will increase L shoulder ER to at least 50 deg to be able to improve ability to reach laterally. Pt will consistent be able to set L scapula with exercises to demonstrate improved mechanics for using the LUE. Pt will decreased functional limitation per SPADI to < 67% to demonstrate a meaningful change in symptoms and less dysfunction with ADLs. LTG: (to be met in 13 treatments)  Pt will be able to reach overhead without an increase in pain to be able to work in her kitchen. Pt will report < 2 days per week incidence of burning sensation into the L hand to improve tolerance to use arm with ADLs. Pt will increase strength of all major muscle groups of the UEs to 5/5 or greater demonstrating improved strength needed to maximize safety and efficiency with ADLs involving lifting and carrying. Pt will be independent with home program addressing strength, flexibility and balance to maximize gains made in therapy to improve overall functional capacity for mobility. Pt will decreased functional limitation per SPADI to < 57% to demonstrate a meaningful change in symptoms and less dysfunction with ADLs. Pt. Education:  [x] Yes  [] No  [x] Reviewed Prior HEP/Ed  Method of Education: [x] Verbal  [] Demo  [x] Written  Comprehension of Education:  [x] Verbalizes understanding. [x] Demonstrates understanding. [x] Needs review. [] Demonstrates/verbalizes HEP/Ed previously given. HEP:   AAROM FLEX   AAROM ER   Supine arm circles      Plan: [x] Continue per plan of care.    [] Other:      Treatment Charges: Mins Units   []

## 2022-12-14 ENCOUNTER — HOSPITAL ENCOUNTER (OUTPATIENT)
Dept: PHYSICAL THERAPY | Age: 57
Setting detail: THERAPIES SERIES
Discharge: HOME | End: 2022-12-14
Payer: COMMERCIAL

## 2022-12-14 PROCEDURE — 97110 THERAPEUTIC EXERCISES: CPT

## 2022-12-14 PROCEDURE — 97016 VASOPNEUMATIC DEVICE THERAPY: CPT

## 2022-12-14 NOTE — FLOWSHEET NOTE
[facility]   [address]   Phone: [phone]   Fax: [phone]    Physical Therapy Daily Treatment Note      Date:  2022  Patient Name:  Marc Garg    :  1965  MRN: [id number]  Physician: Edmund Lubin MD                                 Insurance: [facility] --  [employer]ARD [employer]/auth per [facility] -- Iraida Petty per [facility] deferred to [address] [place] [address] Diagnosis:   W45.989, M47.816 (ICD-10-CM) - Degenerative joint disease of cervical and lumbar spine   M75.82 (ICD-10-CM) - Rotator cuff tendinitis, left                             Rehab Codes: R25 pain , M25.60 stiffness, R53.1 weakness   Onset Date: 2 months ago                                  Next 's appt: 23- PMR   Visit# / total visits:   Cancels/No Shows: 0/0    Precautions: high anxiety,     Subjective:  Pt reports shoulder still catches with certain motions and exercises. Pt denies any issues after last visit.       Pain:  [x] Yes  [] No Location: L shoulder Pain Rating: (0-10 scale) 10/10 with movement   Pain altered Tx:  [x] No  [] Yes  Action:  Comments:    Objective:  INTERVENTIONS  INTERVENTIONS  Reps/ Time Weight/ Level Completed  Today Comments          MODALITIES        Vasocompression -- L shoulder  10 min  34 deg  x           MANUAL -  L shoulder 10 min TOTAL      STM to anterior shoulder/pec 5'  x    Distraction  3'      PA & Inf  L GH jt mobs  5'  x                         EXERCISES        UBE: F/B  2/2 min Level 2 x Cues to keep elbows closer to trunk to reduce shoulder pain    AAROM Flexion 15x  x    AAROM ABD    Limited understanding so stopped    PROM D2 flexion  15x  x    PROM ER @ 45 deg  15x  x    Posterior shoulder stretch  2x30s   x Tried supine but tolerates best sitting    Supine arm at 90 deg - circles CW/CCW 20x ea    Cues to keep in pain free range    Supine serratus punches  2x10   Guided 1st set to ensure understanding    Supine B ER pull apart  2x10  Yellow      supine scap depression 10x      Prone scap retractions 10x      Prone ITY 10x   Only ably to perform I T    Prone extension 10x             Standing        W ball rolls  10x ea   x Yellow ball on wall    TB straight arm pull downs 20x Red x    TB rows  20x Red  x    TB horizontal Abd  20x  Red  x    Other:    Specific Instructions for next treatment begin with manual work to the L shoulder, increase ROM as tolerated (pulleys/ball on wall), scap strengthening ( IYT), vaso if needed  -- avoid arm at 90 deg movements       Assessment: [x] Progressing toward goals. Continued with manual to anterior shoulder to decrease muscle tightness in anterior shoulder. Also continued with [facility] joint mobs for improved mobility in shoulder. Good tolerance to AAROM Flexion and PROM exercise still requires cueing to avoid sharp pains with movements. Tolerated ball on the wall very well and scapular strengthening exercises. Cueing to relax upper traps with all standing exercises with good carryover to finish each exercise. Ended with vasocompression to decrease soreness and discomfort. [] No change. [] Other:    [x] Patient would continue to benefit from skilled physical therapy services in order to:  increase L shoulder joint mobility, improve tightness in the postural muscles, optimize posture, increase scapular control, and improve gross strength of L shoulder to improve ability to reach and use the L arm with ADLs. GOALS:   STG: (to be met in 6 treatments)  Pt will report no greater than 2/10 upon arrival to sessions demonstrating less symptoms with ADLs. Pt will demonstrate improved postural awareness with only minimal cues to correct posture throughout session to prevent undue stress to spine. Pt will improve L shoulder flexion and abductions to > 150 deg to demonstrate ability to reach overhead. Pt will increase L shoulder IR to at least L1 to be able to dress more efficiently. Pt will increase L shoulder ER to at least 50 deg to be able to improve ability to reach laterally. Pt will consistent be able to set L scapula with exercises to demonstrate improved mechanics for using the LUE. Pt will decreased functional limitation per SPADI to < 67% to demonstrate a meaningful change in symptoms and less dysfunction with ADLs. LTG: (to be met in 13 treatments)  Pt will be able to reach overhead without an increase in pain to be able to work in her kitchen. Pt will report < 2 days per week incidence of burning sensation into the L hand to improve tolerance to use arm with ADLs. Pt will increase strength of all major muscle groups of the UEs to 5/5 or greater demonstrating improved strength needed to maximize safety and efficiency with ADLs involving lifting and carrying. Pt will be independent with home program addressing strength, flexibility and balance to maximize gains made in therapy to improve overall functional capacity for mobility. Pt will decreased functional limitation per SPADI to < 57% to demonstrate a meaningful change in symptoms and less dysfunction with ADLs. Pt. Education:  [x] Yes  [] No  [x] Reviewed Prior HEP/Ed- continued education on avoiding increased pain with all exercises. Method of Education: [x] Verbal  [] Demo  [x] Written  Comprehension of Education:  [x] Verbalizes understanding. [x] Demonstrates understanding. [x] Needs review. [] Demonstrates/verbalizes HEP/Ed previously given. HEP:   AAROM FLEX   AAROM ER   Supine arm circles      Plan: [x] Continue per plan of care.    [] Other:      Treatment Charges: Mins Units   []  Modalities     [x]  Ther Exercise 30 2   [x]  Manual Therapy 10 1   []  Ther Activities     []  Aquatics     []  Neuromuscular     [x] Vasocompression 10 1   [] Gait Training     [] Dry needling        [] 1 or 2 muscles        [] 3 or more muscles     []  Other     Total Treatment time 50 4     Time In: 4338 Time Out: 5099     Electronically signed by:  Dinesh Gauthier PTA

## 2022-12-19 ENCOUNTER — HOSPITAL ENCOUNTER (OUTPATIENT)
Dept: PHYSICAL THERAPY | Age: 57
Setting detail: THERAPIES SERIES
Discharge: HOME | End: 2022-12-19
Payer: COMMERCIAL

## 2022-12-19 PROCEDURE — 97140 MANUAL THERAPY 1/> REGIONS: CPT

## 2022-12-19 PROCEDURE — 97016 VASOPNEUMATIC DEVICE THERAPY: CPT

## 2022-12-19 PROCEDURE — 97110 THERAPEUTIC EXERCISES: CPT

## 2022-12-19 NOTE — FLOWSHEET NOTE
509 [facility]   [address]   Phone: [phone]   Fax: [phone]    Physical Therapy Daily Treatment Note    Date:  2022  Patient Name:  Kaitlin Smiley    :  1965  MRN: [id number]  Physician: Zaida Singh MD                                 Insurance: [facility] --  HARD [employer] deferred to [address] [place] [address] Diagnosis:   L33.450, M47.816 (ICD-10-CM) - Degenerative joint disease of cervical and lumbar spine   M75.82 (ICD-10-CM) - Rotator cuff tendinitis, left                             Rehab Codes: R25 pain , M25.60 stiffness, R53.1 weakness   Onset Date: 2 months ago                                  Next 's appt: 23- PMR   Visit# / total visits:   Cancels/No Shows: 0/0    Precautions: high anxiety,     Subjective:  Pt reports pain still high with certain movements. Continues to reports performing HEP and stretches at home to further ROM. States still gets very frustrated with pain.       Pain:  [x] Yes  [] No Location: L shoulder Pain Rating: (0-10 scale) 10/10 with movement   Pain altered Tx:  [x] No  [] Yes  Action:  Comments:    Objective:  INTERVENTIONS  INTERVENTIONS  Reps/ Time Weight/ Level Completed  Today Comments          MODALITIES        Vasocompression -- L shoulder  10 min  34 deg  x           MANUAL -  L shoulder 10 min TOTAL      STM to anterior shoulder/pec 5'  x    Distraction  3'      PA & Inf  L GH jt mobs  5'  x                         EXERCISES        UBE: F/B  2/2 min Level 2 x Cues to keep elbows closer to trunk to reduce shoulder pain    AAROM Flexion 15x  x    AAROM ABD    Limited understanding so stopped    PROM D2 flexion  15x  x    PROM ER @ 45 deg  15x  x    Posterior shoulder stretch  2x30s   x Tried supine but tolerates best sitting    Supine arm at 90 deg - circles CW/CCW 20x ea    Cues to keep in pain free range    Supine serratus punches 2x10   Guided 1st set to ensure understanding    Supine B ER pull apart  2x10  Yellow      supine scap depression 10x      Prone scap retractions 10x      Prone ITY 10x   Only ably to perform I T    Prone extension 10x             Standing        W ball rolls  10x ea   x Yellow ball on wall    TB straight arm pull downs 20x Red x    TB rows  20x Red  x    TB horizontal Abd  20x  Red  x    Towel IR Stretch 5\"x10  x    Tband IR 10x Red x    Other:    Specific Instructions for next treatment begin with manual work to the L shoulder, increase ROM as tolerated (pulleys/ball on wall), scap strengthening ( IYT), vaso if needed  -- avoid arm at 90 deg movements       Assessment: [x] Progressing toward goals. Continued with manual to left shoulder to decrease pain and tightness. Continued with exercise in pain free ranges for scapular strengthening. Patient became emotional when taking coat off due to pain in L shoulder. Continued difficulty with relaxing for ROM exercises. Good tolerance to IR stretch w/ towel with improved mobility afterwards. [] No change. [] Other:    [x] Patient would continue to benefit from skilled physical therapy services in order to:  increase L shoulder joint mobility, improve tightness in the postural muscles, optimize posture, increase scapular control, and improve gross strength of L shoulder to improve ability to reach and use the L arm with ADLs. GOALS:   STG: (to be met in 6 treatments)  Pt will report no greater than 2/10 upon arrival to sessions demonstrating less symptoms with ADLs. Pt will demonstrate improved postural awareness with only minimal cues to correct posture throughout session to prevent undue stress to spine. Pt will improve L shoulder flexion and abductions to > 150 deg to demonstrate ability to reach overhead. Pt will increase L shoulder IR to at least L1 to be able to dress more efficiently.    Pt will increase L shoulder ER to at least 50 deg to be able to improve ability to reach laterally. Pt will consistent be able to set L scapula with exercises to demonstrate improved mechanics for using the LUE. Pt will decreased functional limitation per SPADI to < 67% to demonstrate a meaningful change in symptoms and less dysfunction with ADLs. LTG: (to be met in 13 treatments)  Pt will be able to reach overhead without an increase in pain to be able to work in her kitchen. Pt will report < 2 days per week incidence of burning sensation into the L hand to improve tolerance to use arm with ADLs. Pt will increase strength of all major muscle groups of the UEs to 5/5 or greater demonstrating improved strength needed to maximize safety and efficiency with ADLs involving lifting and carrying. Pt will be independent with home program addressing strength, flexibility and balance to maximize gains made in therapy to improve overall functional capacity for mobility. Pt will decreased functional limitation per SPADI to < 57% to demonstrate a meaningful change in symptoms and less dysfunction with ADLs. Pt. Education:  [x] Yes  [] No  [x] Reviewed Prior HEP/Ed- continued education on avoiding increased pain with all exercises. Method of Education: [x] Verbal  [] Demo  [x] Written  Comprehension of Education:  [x] Verbalizes understanding. [x] Demonstrates understanding. [x] Needs review. [] Demonstrates/verbalizes HEP/Ed previously given. Plan: [x] Continue per plan of care.    [] Other:      Treatment Charges: Mins Units   []  Modalities     [x]  Ther Exercise 30 2   [x]  Manual Therapy 10 1   []  Ther Activities     []  Aquatics     []  Neuromuscular     [x] Vasocompression 10 1   [] Gait Training     [] Dry needling        [] 1 or 2 muscles        [] 3 or more muscles     []  Other     Total Treatment time 50 4     Time In:  1320           Time Out:  7146    Electronically signed by:  Mansfield Barthel, PTA

## 2022-12-21 ENCOUNTER — HOSPITAL ENCOUNTER (OUTPATIENT)
Dept: PHYSICAL THERAPY | Age: 57
Setting detail: THERAPIES SERIES
Discharge: HOME | End: 2022-12-21
Payer: COMMERCIAL

## 2022-12-21 PROCEDURE — 97016 VASOPNEUMATIC DEVICE THERAPY: CPT

## 2022-12-21 PROCEDURE — 97140 MANUAL THERAPY 1/> REGIONS: CPT

## 2022-12-21 PROCEDURE — 97110 THERAPEUTIC EXERCISES: CPT

## 2022-12-21 NOTE — FLOWSHEET NOTE
509 [facility]   [address]   Phone: [phone]   Fax: [phone]    Physical Therapy Daily Treatment Note    Date:  2022  Patient Name:  Dianne Clark    :  1965  MRN: [id number]  Physician: Radha Soliz MD                                 Insurance: [facility] --  HARD [employer]/auth per cares[employer] -- [employer] per caresource deferred to [address] [place] [address] Diagnosis:   U27.637, M47.816 (ICD-10-CM) - Degenerative joint disease of cervical and lumbar spine   M75.82 (ICD-10-CM) - Rotator cuff tendinitis, left                             Rehab Codes: R25 pain , M25.60 stiffness, R53.1 weakness   Onset Date: 2 months ago                                  Next 's appt: 23- PMR   Visit# / total visits:   Cancels/No Shows: 0/0    Precautions: high anxiety    Subjective:  Patient reporting that there are overall no changes to her symptoms. Patient reporting she is sleeping better.     Pain:  [x] Yes  [] No Location: L shoulder Pain Rating: (0-10 scale) 2-4/10 at rest and 8/10 with movement  Pain altered Tx:  [x] No  [] Yes  Action:  Comments:    Objective:  INTERVENTIONS  INTERVENTIONS  Reps/ Time Weight/ Level Completed  Today Comments          MODALITIES        Vasocompression -- L shoulder  10 min  34 deg  x           MANUAL -  L shoulder 30min TOTAL      STM to anterior shoulder/pec, TPR to subscap 20mins  x    Distraction  3'      PA & Inf  L GH jt mobs  5'  x    Scap mobs- M/L, Sup/Inferior 5'  x  added                 EXERCISES        UBE: F/B  2/2 min Level 2 x Cues to keep elbows closer to trunk to reduce shoulder pain    AAROM Flexion 15x  x    AAROM ABD    Limited understanding so stopped    PROM D2 flexion  15x  x    PROM ER @ 45 deg  15x  x    Posterior shoulder stretch  2x30s   x Tried supine but tolerates best sitting    Supine arm at 90 deg - circles CW/CCW 20x ea    Cues to keep in pain free range    Supine serratus punches  2x10   Guided 1st set to ensure understanding    Supine B ER pull apart  2x10  Yellow      supine scap depression 10x      Prone scap retractions 10x      Prone ITY 10x   Only ably to perform I T    Prone extension 10x             Standing        W ball rolls  10x ea    Yellow ball on wall    TB straight arm pull downs 20x Red     TB rows  20x Red      TB horizontal Abd  20x  Red      Towel IR Stretch 5\"x10      Tband IR 10x Red     Other:    Specific Instructions for next treatment begin with manual work to the L shoulder, increase ROM as tolerated (pulleys/ball on wall), scap strengthening ( IYT), vaso if needed  -- avoid arm at 90 deg movements       Assessment: [x] Progressing toward goals. Began session with manual work to L shoulder and pec. Educated patient on the importance of relaxing her shoulder to prevent excessive guarding. Added subscap release and scap mobs this session to improve scap and shoulder mobility. Patient reporting relief with manual this session but not lasting. Patient continues to be very limited with ROM due to pain. Completed session with vaso for pain management. [] No change. [] Other:    [x] Patient would continue to benefit from skilled physical therapy services in order to:  increase L shoulder joint mobility, improve tightness in the postural muscles, optimize posture, increase scapular control, and improve gross strength of L shoulder to improve ability to reach and use the L arm with ADLs. GOALS:   STG: (to be met in 6 treatments)  Pt will report no greater than 2/10 upon arrival to sessions demonstrating less symptoms with ADLs. Pt will demonstrate improved postural awareness with only minimal cues to correct posture throughout session to prevent undue stress to spine. Pt will improve L shoulder flexion and abductions to > 150 deg to demonstrate ability to reach overhead.    Pt will increase L shoulder IR to at least L1 to be able to dress more efficiently. Pt will increase L shoulder ER to at least 50 deg to be able to improve ability to reach laterally. Pt will consistent be able to set L scapula with exercises to demonstrate improved mechanics for using the LUE. Pt will decreased functional limitation per SPADI to < 67% to demonstrate a meaningful change in symptoms and less dysfunction with ADLs. LTG: (to be met in 13 treatments)  Pt will be able to reach overhead without an increase in pain to be able to work in her kitchen. Pt will report < 2 days per week incidence of burning sensation into the L hand to improve tolerance to use arm with ADLs. Pt will increase strength of all major muscle groups of the UEs to 5/5 or greater demonstrating improved strength needed to maximize safety and efficiency with ADLs involving lifting and carrying. Pt will be independent with home program addressing strength, flexibility and balance to maximize gains made in therapy to improve overall functional capacity for mobility. Pt will decreased functional limitation per SPADI to < 57% to demonstrate a meaningful change in symptoms and less dysfunction with ADLs. Pt. Education:  [x] Yes  [] No  [x] Reviewed Prior HEP/Ed- continued education on avoiding increased pain with all exercises. Method of Education: [x] Verbal  [] Demo  [x] Written  Comprehension of Education:  [x] Verbalizes understanding. [x] Demonstrates understanding. [x] Needs review. [] Demonstrates/verbalizes HEP/Ed previously given. Plan: [x] Continue per plan of care.    [] Other:      Treatment Charges: Mins Units   []  Modalities     [x]  Ther Exercise 10 1   [x]  Manual Therapy 30 2   []  Ther Activities     []  Aquatics     []  Neuromuscular     [x] Vasocompression 10 1   [] Gait Training     [] Dry needling        [] 1 or 2 muscles        [] 3 or more muscles     []  Other     Total Treatment time 50 4     Time In:  1754          Time Out:  8966    Electronically signed by:  Camilla Cook, PTA

## 2022-12-28 ENCOUNTER — HOSPITAL ENCOUNTER (OUTPATIENT)
Dept: PHYSICAL THERAPY | Age: 57
Setting detail: THERAPIES SERIES
Discharge: HOME | End: 2022-12-28
Payer: COMMERCIAL

## 2022-12-28 NOTE — CARE COORDINATION
[] [address]  [place]: [phone]   F: [phone]     Physical Therapy Cancel/No Show note    Date: 2022  Patient: Goldy Alexander  : 1965  MRN: [id number]    Visit Count:   Cancels/No Shows to date:     For today's appointment patient:    [x]  Cancelled    [x] Rescheduled appointment    [] No-show     Reason given by patient:    [x]  Patient ill    []  Conflicting appointment    [] No transportation      [] Conflict with work    [] No reason given    [] Weather related    [] [id number]    [] Other:      Comments:        [x] Next appointment was confirmed    Electronically signed by: Shala Corey PT

## 2022-12-30 ENCOUNTER — APPOINTMENT (OUTPATIENT)
Dept: PHYSICAL THERAPY | Age: 57
End: 2022-12-30
Payer: COMMERCIAL

## 2023-01-04 ENCOUNTER — HOSPITAL ENCOUNTER (OUTPATIENT)
Dept: PHYSICAL THERAPY | Age: 58
Setting detail: THERAPIES SERIES
Discharge: HOME | End: 2023-01-04
Payer: COMMERCIAL

## 2023-01-04 PROCEDURE — 97140 MANUAL THERAPY 1/> REGIONS: CPT

## 2023-01-04 PROCEDURE — 97110 THERAPEUTIC EXERCISES: CPT

## 2023-01-04 PROCEDURE — 97016 VASOPNEUMATIC DEVICE THERAPY: CPT

## 2023-01-04 NOTE — FLOWSHEET NOTE
[facility]patient Physical Therapy   [address]   Phone: [phone]   Fax: [phone]    Physical Therapy Daily Treatment Note    Date:  2023  Patient Name:  Jaquelin Babb    :  1965  MRN: [id number]  Physician: Sharif Veloz MD                                 Insurance: [facility] --  HARD MAX/auth per caresource -- [facility] Bristle [facility] deferred to [address] [place] [address] Diagnosis:   B39.732, M47.816 (ICD-10-CM) - Degenerative joint disease of cervical and lumbar spine   M75.82 (ICD-10-CM) - Rotator cuff tendinitis, left                             Rehab Codes: R25 pain , M25.60 stiffness, R53.1 weakness   Onset Date: 2 months ago                                  Next 's appt: 23- PMR   Visit# / total visits:   Cancels/No Shows: 1/0    Precautions: high anxiety    Subjective:  Patient reporting her level of pain has slightly improved but continues to be bothersome. Patient reporting more mobility around the shoulder blade. Patient reporting pain in bicep.     Pain:  [x] Yes  [] No Location: L shoulder Pain Rating: (0-10 scale) 3/10 at rest and 8/10 with movement  Pain altered Tx:  [x] No  [] Yes  Action:  Comments:    Objective:  INTERVENTIONS  INTERVENTIONS  Reps/ Time Weight/ Level Completed  Today Comments          MODALITIES        Vasocompression -- L shoulder  10 min  34 deg  x           MANUAL -  L shoulder 25 min TOTAL      STM to anterior shoulder/pec, TPR to subscap 15mins  x    Distraction  3'      PA & Inf  L GH jt mobs  5'  x    Scap mobs- M/L, Sup/Inferior 5'  x  added                 EXERCISES        UBE: F/B  2/2 min Level 2 x Cues to keep elbows closer to trunk to reduce shoulder pain    AAROM Flexion 15x  x    AAROM ABD    Limited understanding so stopped    PROM D2 flexion  15x      PROM ER @ 45 deg  15x  x    Posterior shoulder stretch  2x30s    Tried supine but tolerates best sitting    Supine arm at 90 deg - circles CW/CCW 20x ea    Cues to keep in pain free range    Supine serratus punches  2x10   Guided 1st set to ensure understanding    Supine B ER pull apart  2x10  Yellow      supine scap depression 10x      Prone scap retractions 10x      Prone ITY 10x   Only ably to perform I T    Prone extension 10x             Standing        W ball rolls  10x ea    Yellow ball on wall    TB straight arm pull downs 20x Red     TB rows  20x Red      TB horizontal Abd  20x  Red      Towel IR Stretch 5\"x10      Tband IR 10x Red     Other:    Specific Instructions for next treatment begin with manual work to the L shoulder, increase ROM as tolerated (pulleys/ball on wall), scap strengthening ( IYT), vaso if needed  -- avoid arm at 90 deg movements       Assessment: [x] Progressing toward goals. 1/4: Continued with manual at the beginning of session to decrease tightness to improve mobility of shoulder. Patient continues to be tender in latissimus dorsi attachment and in pec minor. Patient requires frequent cueing to stay on task. Patient also continues to have the tendency to move impulsively when she experiences significant pain. Completed session with vaso for pain management. [] No change. [] Other:    [x] Patient would continue to benefit from skilled physical therapy services in order to:  increase L shoulder joint mobility, improve tightness in the postural muscles, optimize posture, increase scapular control, and improve gross strength of L shoulder to improve ability to reach and use the L arm with ADLs. GOALS:   STG: (to be met in 6 treatments)  Pt will report no greater than 2/10 upon arrival to sessions demonstrating less symptoms with ADLs. Pt will demonstrate improved postural awareness with only minimal cues to correct posture throughout session to prevent undue stress to spine. Pt will improve L shoulder flexion and abductions to > 150 deg to demonstrate ability to reach overhead.    Pt will increase L shoulder IR to at least L1 to be able to dress more efficiently. Pt will increase L shoulder ER to at least 50 deg to be able to improve ability to reach laterally. Pt will consistent be able to set L scapula with exercises to demonstrate improved mechanics for using the LUE. Pt will decreased functional limitation per SPADI to < 67% to demonstrate a meaningful change in symptoms and less dysfunction with ADLs. LTG: (to be met in 13 treatments)  Pt will be able to reach overhead without an increase in pain to be able to work in her kitchen. Pt will report < 2 days per week incidence of burning sensation into the L hand to improve tolerance to use arm with ADLs. Pt will increase strength of all major muscle groups of the UEs to 5/5 or greater demonstrating improved strength needed to maximize safety and efficiency with ADLs involving lifting and carrying. Pt will be independent with home program addressing strength, flexibility and balance to maximize gains made in therapy to improve overall functional capacity for mobility. Pt will decreased functional limitation per SPADI to < 57% to demonstrate a meaningful change in symptoms and less dysfunction with ADLs. Pt. Education:  [x] Yes  [] No  [x] Reviewed Prior HEP/Ed- continued education on avoiding increased pain with all exercises. Method of Education: [x] Verbal  [] Demo  [x] Written  Comprehension of Education:  [x] Verbalizes understanding. [x] Demonstrates understanding. [x] Needs review. [] Demonstrates/verbalizes HEP/Ed previously given. Plan: [x] Continue per plan of care.    [] Other:      Treatment Charges: Mins Units   []  Modalities     [x]  Ther Exercise 18 1   [x]  Manual Therapy 25 2   []  Ther Activities     []  Aquatics     []  Neuromuscular     [x] Vasocompression 10 1   [] Gait Training     [] Dry needling        [] 1 or 2 muscles        [] 3 or more muscles     []  Other     Total Treatment time 53 4     Time In: 1300         Time Out:  1939    Electronically signed by:  Makayla Karimi, PTA

## 2023-01-06 ENCOUNTER — HOSPITAL ENCOUNTER (OUTPATIENT)
Dept: PHYSICAL THERAPY | Age: 58
Setting detail: THERAPIES SERIES
Discharge: HOME | End: 2023-01-06
Payer: COMMERCIAL

## 2023-01-06 PROCEDURE — 97110 THERAPEUTIC EXERCISES: CPT

## 2023-01-06 PROCEDURE — 97140 MANUAL THERAPY 1/> REGIONS: CPT

## 2023-01-06 NOTE — PROGRESS NOTES
[facility]   [address] [facility] [address]   Phone: [phone]   Fax: [phone]    Physical Therapy Daily Treatment Note/Progress Note     Date:  2023  Patient Name:  Loulou Rendon    :  1965  MRN: [id number]  Physician: Tristen Lomeli MD                                 Insurance: [facility] --  HARD MAX/auth per caresource -- Suzzanne Exon per caresource deferred to [address] [place] [address] Diagnosis:   S66.029, M47.816 (ICD-10-CM) - Degenerative joint disease of cervical and lumbar spine   M75.82 (ICD-10-CM) - Rotator cuff tendinitis, left                             Rehab Codes: R25 pain , M25.60 stiffness, R53.1 weakness   Onset Date: 2 months ago                                  Next 's appt: 23- PMR   Visit# / total visits: 10/20 ( for )  Cancels/No Shows:   Date of initial visit: 22        Date of PN: 23 (visit 10)  Formal progress note reporting period:  22 - 23     Precautions: high anxiety    Subjective:  Patient reporting pain is improved. She still gets pain with reaching over head but it is not as intense as initially. She is reporting pain in the L lat and into the axilla. She feels that her overall trunk mobility is more mobile. Pt still getting intense pain with reaching over 90 degrees.     Pain:  [x] Yes  [] No Location: L bicep Pain Rating: (0-10 scale) 3/10 at rest and 6/10 with overhead reaching   Pain altered Tx:  [x] No  [] Yes  Action:  Comments:    Objective:  INTERVENTIONS  INTERVENTIONS  Reps/ Time Weight/ Level Completed  Today Comments          MODALITIES        Vasocompression -- L shoulder  10 min  34 deg             MANUAL -  L shoulder 13 min TOTAL      STM to anterior shoulder/pec, TPR to subscap 15mins      Distraction  2'  x    PA & Inf  L GH jt mobs  5'  x    Shoulder ABD with scap mob  3'  x    STM to L lat with shoulder flexion  3 min   x           EXERCISES        pulleys 2 min ea   x Flexion, scaption, abduction    Open books  15x   x Rotating L only    AAROM Flexion 15x      AAROM ABD    Limited understanding so stopped    PROM D2 flexion  15x      PROM ER @ 45 deg  15x      Posterior shoulder stretch  2x30s    Tried supine but tolerates best sitting    Supine arm at 90 deg - circles CW/CCW 20x ea    Cues to keep in pain free range    Supine serratus punches  2x10   Guided 1st set to ensure understanding    Supine B ER pull apart  2x10  Yellow      supine scap depression 10x      Prone scap retractions 10x      Prone ITY 10x   Only ably to perform I T    Prone extension 10x             Standing        W ball rolls  10x ea    Yellow ball on wall    TB straight arm pull downs 20x Red     TB rows  20x Red      TB horizontal Abd  20x  Red      Towel IR Stretch 5\"x10      Tband IR 10x Red     Other:    Test & measures 1/6/23    Range of Motion  Left Range of Motion  Right Strength  Left Strength  Right   Shoulder Flexion  Sitting 115  Pulleys: 130  170 4- 5   Scaption  Pulleys: 130       Shoulder Abduction Sitting  105  Pulleys: 140   170 3 5   Shoulder ER Sitting  33   3+ 5   Shoulder IR     5 5   Elbow Flexion     5 5   Elbow Extension     5 5    Apley   - cross body reaching : full -- posterior shoulder   - IR: ipsilateral glute   - ER: limited -- posterior head   SPADI: 79/130 =  61% functional limitation (previously 100/130 = 77% functional limtation )    Specific Instructions for next treatment: continue with manual work, really focus on ROM (pulleys, wall walk ups, ROM). and to work on scapular mechanics       Assessment: [x] Progressing toward goals. Pt has completed 10 visits in POC focusing on her L shoulder pain with movement. Her ROM in sitting is less than what was noted at eval as she is very guarded by pain. This raises concern for risk of adhesive capsulitis due to limited movement. Pt does show better ROM when using the pulleys and she is assisting the movement.  Potentially anxiousness around moving this arm due to pain is causing limitation. Pt also has some hypomobility of the L shoulder too. Feel she has poor scapular mechanics and a tight latissimus that is affecting. Addressed these deficits with interventions this date. Do see improvements in pain levels and overall function. Has a significant improvement per SPADI. Feel that with extending POC to total of 20 visits and focusing on better range and mechanics will improve her function. [] No change. [] Other:    [x] Patient would continue to benefit from skilled physical therapy services in order to:  increase L shoulder joint mobility, improve tightness in the postural muscles, optimize posture, increase scapular control, and improve gross strength of L shoulder to improve ability to reach and use the L arm with ADLs. GOALS: Assesses 1/6/22  STG: (to be met in 6 treatments)  Pt will report no greater than 2/10 upon arrival to sessions demonstrating less symptoms with ADLs.    - 1/6: not met -- pain still with movement and arrival to sessions    Pt will demonstrate improved postural awareness with only minimal cues to correct posture throughout session to prevent undue stress to spine.  -1/6: progressing -- still compensating and torquing, needing cues to stability      Pt will improve L shoulder flexion and abductions to > 150 deg to demonstrate ability to reach overhead.    -1/6: progressing -- 130 on pulleys    Pt will increase L shoulder IR to at least L1 to be able to dress more efficiently. - 1/6: progressing -- improved to glute    Pt will increase L shoulder ER to at least 50 deg to be able to improve ability to reach laterally.     -1/6: progressing -- similar to eval    Pt will consistent be able to set L scapula with exercises to demonstrate improved mechanics for using the LUE.    - 1/6: progressing -- still limited mechanics and control    Pt will decreased functional limitation per SPADI to < 67% to demonstrate a meaningful change in symptoms and less dysfunction with ADLs.    - 1/6: MET -- 61% this date    LTG: (to be met in 13 treatments) -- will assess at a later time   Pt will be able to reach overhead without an increase in pain to be able to work in her kitchen. Pt will report < 2 days per week incidence of burning sensation into the L hand to improve tolerance to use arm with ADLs. Pt will increase strength of all major muscle groups of the UEs to 5/5 or greater demonstrating improved strength needed to maximize safety and efficiency with ADLs involving lifting and carrying. Pt will be independent with home program addressing strength, flexibility and balance to maximize gains made in therapy to improve overall functional capacity for mobility. Pt will decreased functional limitation per SPADI to < 57% to demonstrate a meaningful change in symptoms and less dysfunction with ADLs. Pt. Education:  [x] Yes  [] No  [x] Reviewed Prior HEP/Ed- continued education on avoiding increased pain with all exercises. Method of Education: [x] Verbal  [] Demo  [x] Written  Comprehension of Education:  [x] Verbalizes understanding. [x] Demonstrates understanding. [x] Needs review. [] Demonstrates/verbalizes HEP/Ed previously given. Plan: [] Continue per plan of care.    [x] Other: see below     Treatment Plan:  [x] Therapeutic Exercise   21514  [] Iontophoresis: 4 mg/mL Dexamethasone Sodium Phosphate  mAmin  85411   [x] Therapeutic Activity  89152 [x] Vasopneumatic cold with compression  38002    [] Gait Training   52865 [] Ultrasound   67338   [x] Neuromuscular Re-education  72650 [] Electrical Stimulation Unattended  75013   [x] Manual Therapy  28976 [] Electrical Stimulation Attended  22670   [x] Instruction in HEP  [] Lumbar/Cervical Traction  31938   [] Aquatic Therapy   63834 [x] Cold/hotpack    [] Massage   33143      [] Dry Needling, 1 or 2 muscles  37655   [] Biofeedback, first 15 minutes   [id number]  [] Biofeedback, additional 15 minutes   41008 [] Dry Needling, 3 or more muscles  17628      Patient Status:     [] Continue per initial plan of care. [x] Additional visits necessary. -- extending POC to total of 20 visits     [] Other:     Requested Frequency/Duration: 2 times per week for total of 20 treatments  treatments.                  Treatment Charges: Mins Units   []  Modalities     [x]  Ther Exercise 29 2   [x]  Manual Therapy 13 1   []  Ther Activities     []  Aquatics     []  Neuromuscular     [] Vasocompression     [] Gait Training     [] Dry needling        [] 1 or 2 muscles        [] 3 or more muscles     []  Other     Total Treatment time 42 3     Time In:  7030      Time Out:  4677     Electronically signed by:  Seth Nair PT

## 2023-01-11 ENCOUNTER — HOSPITAL ENCOUNTER (OUTPATIENT)
Dept: PHYSICAL THERAPY | Age: 58
Setting detail: THERAPIES SERIES
Discharge: HOME | End: 2023-01-11
Payer: COMMERCIAL

## 2023-01-11 PROCEDURE — 97110 THERAPEUTIC EXERCISES: CPT

## 2023-01-11 PROCEDURE — 97140 MANUAL THERAPY 1/> REGIONS: CPT

## 2023-01-11 NOTE — FLOWSHEET NOTE
[facility]   [address] [facility] [address]   Phone: [phone]   Fax: [phone]    Physical Therapy Daily Treatment Note    Date:  2023  Patient Name:  Damion Burns    :  1965  MRN: [id number]  Physician: Jyothi Montes MD                                 Insurance: [facility] --  HARD [employer]/auth per caresource -- Norval Bene per caresource deferred to [address] [place] [address] Diagnosis:   T05.563, M47.816 (ICD-10-CM) - Degenerative joint disease of cervical and lumbar spine   M75.82 (ICD-10-CM) - Rotator cuff tendinitis, left                             Rehab Codes: R25 pain , M25.60 stiffness, R53.1 weakness   Onset Date: 2 months ago                                  Next 's appt: 23- PMR   Visit# / total visits:  (3/20 for )  Cancels/No Shows:   Date of initial visit: 22        Date of PN: 23 (visit 10)    Precautions: high anxiety    Subjective:  Patient noting things are about the same as far as use of the LUE.  Still having pain with fast movements     Pain:  [x] Yes  [] No Location: L bicep Pain Rating: (0-10 scale) 3/10 at rest and 6/10 with overhead reaching   Pain altered Tx:  [x] No  [] Yes  Action:  Comments:    Objective:  INTERVENTIONS  INTERVENTIONS  Reps/ Time Weight/ Level Completed  Today Comments          MODALITIES        Vasocompression -- L shoulder  10 min  34 deg             MANUAL -  L shoulder 13 min TOTAL      STM to anterior shoulder/pec, TPR to subscap 15mins      Distraction  2'  x    PA & Inf  L GH jt mobs  5'  x    Shoulder ABD with scap mob, Lat RP  3'  x Sidelying    Sidelying shoulder flxion with scap mob 3'  x    STM to L lat with shoulder flexion  3 min              EXERCISES        pulleys 2 min ea   x Flexion, scaption, abduction    Open books  15x   x Rotating L only    sidelying Flexion 15x  x    Sidelying ABD 15x  x    PROM arm circles: CW/CCW  15x  x    Supine serratus punches  2x15 0-1# x Guided 1st set to ensure understanding    Scapular depression with putty holes  20x  x    Supine B ER pull apart  2x10  Yellow      supine scap depression 10x      Prone scap retractions 10x      Prone ITY 10x   Only ably to perform I T    Prone extension 10x             Standing        W ball rolls  10x ea    Yellow ball on wall    TB straight arm pull downs 20x Red     TB rows  20x Red      TB horizontal Abd  20x  Red      Towel IR Stretch 5\"x10      Tband IR 10x Red     Other:    Specific Instructions for next treatment: continue with manual work, really focus on ROM (pulleys, wall walk ups, ROM). and to work on scapular mechanics       Assessment: [x] Progressing toward goals. . Began with pulleys to increase ROM. Still limited with abduction and shoulder rotations. Feel this is showing signs that are consistent with adhesive capsulitis with joint hypomobilty and abnormal end feels with motion. Trying to compensate a lot with scapular movements. Tried to maximize range with PROM with assist to scapular motions to get greater mobility. Followed with pt trying to be active in these ranges. Noting that scapular depression is difficulty and having limited inferior glide tried putty on cone to facilitate this. Pt tolerates well and pleased with gains made in range. Updated pt's home program for more ROM interventions. [] No change. [] Other:    [x] Patient would continue to benefit from skilled physical therapy services in order to:  increase L shoulder joint mobility, improve tightness in the postural muscles, optimize posture, increase scapular control, and improve gross strength of L shoulder to improve ability to reach and use the L arm with ADLs.     GOALS: Assesses 1/6/22  STG: (to be met in 6 treatments)  Pt will report no greater than 2/10 upon arrival to sessions demonstrating less symptoms with ADLs.    - 1/6: not met -- pain still with movement and arrival to sessions    Pt will demonstrate improved postural awareness with only minimal cues to correct posture throughout session to prevent undue stress to spine.  -1/6: progressing -- still compensating and torquing, needing cues to stability      Pt will improve L shoulder flexion and abductions to > 150 deg to demonstrate ability to reach overhead.    -1/6: progressing -- 130 on pulleys    Pt will increase L shoulder IR to at least L1 to be able to dress more efficiently. - 1/6: progressing -- improved to glute    Pt will increase L shoulder ER to at least 50 deg to be able to improve ability to reach laterally. -1/6: progressing -- similar to eval    Pt will consistent be able to set L scapula with exercises to demonstrate improved mechanics for using the LUE.    - 1/6: progressing -- still limited mechanics and control    Pt will decreased functional limitation per SPADI to < 67% to demonstrate a meaningful change in symptoms and less dysfunction with ADLs.    - 1/6: MET -- 61% this date    LTG: (to be met in 13 treatments) -- will assess at a later time   Pt will be able to reach overhead without an increase in pain to be able to work in her kitchen. Pt will report < 2 days per week incidence of burning sensation into the L hand to improve tolerance to use arm with ADLs. Pt will increase strength of all major muscle groups of the UEs to 5/5 or greater demonstrating improved strength needed to maximize safety and efficiency with ADLs involving lifting and carrying. Pt will be independent with home program addressing strength, flexibility and balance to maximize gains made in therapy to improve overall functional capacity for mobility. Pt will decreased functional limitation per SPADI to < 57% to demonstrate a meaningful change in symptoms and less dysfunction with ADLs. Pt. Education:  [x] Yes  [] No  [x] Reviewed Prior HEP/Ed- continued education on avoiding increased pain with all exercises.   Method of Education: [x] Verbal  [] Demo  [x] Written  Comprehension of Education:  [x] Verbalizes understanding. [x] Demonstrates understanding. [x] Needs review. [] Demonstrates/verbalizes HEP/Ed previously given. Plan: [x] Continue per plan of care.    [] Other: see below          Treatment Charges: Mins Units   []  Modalities     [x]  Ther Exercise 30 2   [x]  Manual Therapy 13 1   []  Ther Activities     []  Aquatics     []  Neuromuscular     [] Vasocompression     [] Gait Training     [] Dry needling        [] 1 or 2 muscles        [] 3 or more muscles     []  Other     Total Treatment time 43 3     Time In:  1:17p      Time Out:  2:00    Electronically signed by:  David Mohan, PT

## 2023-01-13 ENCOUNTER — HOSPITAL ENCOUNTER (OUTPATIENT)
Dept: PHYSICAL THERAPY | Age: 58
Setting detail: THERAPIES SERIES
Discharge: HOME | End: 2023-01-13
Payer: COMMERCIAL

## 2023-01-13 NOTE — FLOWSHEET NOTE
[x] [facility]) - [facility] & Therapy  [address]  [place]: [phone]   F: [phone]     Physical Therapy Cancel/No Show note    Date: 2023  Patient: Sylvain Rich  : 1965  MRN: [id number]    Visit Count:   Cancels/No Shows to date:     For today's appointment patient:    [x]  Cancelled    [] Rescheduled appointment    [] No-show     Reason given by patient:    []  Patient ill    []  Conflicting appointment    [] No transportation      [] Conflict with work    [] No reason given    [] Weather related    [] COVID-19    [x] Other:      Comments:  Patient miscalculated how much time it would take to help her sister this morning and won't be able to make it this session.       [] Next appointment was confirmed    Electronically signed by: Theresa Wilder PTA

## 2023-01-16 ENCOUNTER — HOSPITAL ENCOUNTER (OUTPATIENT)
Dept: PHYSICAL THERAPY | Age: 58
Setting detail: THERAPIES SERIES
Discharge: HOME | End: 2023-01-16
Payer: COMMERCIAL

## 2023-01-16 PROCEDURE — 97140 MANUAL THERAPY 1/> REGIONS: CPT

## 2023-01-16 PROCEDURE — 97110 THERAPEUTIC EXERCISES: CPT

## 2023-01-16 PROCEDURE — 97016 VASOPNEUMATIC DEVICE THERAPY: CPT

## 2023-01-16 RX ORDER — FAMOTIDINE 20 MG/1
TABLET, FILM COATED ORAL
Qty: 60 TABLET | Refills: 5 | Status: SHIPPED | OUTPATIENT
Start: 2023-01-16

## 2023-01-16 NOTE — FLOWSHEET NOTE
509 [facility]   [address]   Phone: [phone]   Fax: [phone]    Physical Therapy Daily Treatment Note    Date:  2023  Patient Name:  Samir Miranda    :  1965  MRN: [id number]  Physician: Parul Martin MD                                 Insurance: [facility] --  HARD MAX[facility] [place] [place] [facility] to [address] [place] [address] Diagnosis:   E14.390, M47.816 (ICD-10-CM) - Degenerative joint disease of cervical and lumbar spine   M75.82 (ICD-10-CM) - Rotator cuff tendinitis, left                             Rehab Codes: R25 pain , M25.60 stiffness, R53.1 weakness   Onset Date: 2 months ago                                  Next 's appt: 23- PMR   Visit# / total visits:  ( for )  Cancels/No Shows:   Date of initial visit: 22        Date of PN: 23 (visit 10)    Precautions: high anxiety    Subjective: Pt arrives 10 minutes late for session. She feels things are progressing and she can use it more. She notes she has been taking meloxicam for 3-4 days. Has not noticed a difference yet.      Pain:  [x] Yes  [] No Location: L bicep Pain Rating: (0-10 scale) 2-3/10 at rest and 6/10 with overhead reaching   Pain altered Tx:  [x] No  [] Yes  Action:  Comments:    Objective:  INTERVENTIONS  INTERVENTIONS  Reps/ Time Weight/ Level Completed  Today Comments          MODALITIES        Vasocompression -- L shoulder  10 min  34 deg             MANUAL -  L shoulder 12 min TOTAL      STM to anterior shoulder/pec, TPR to subscap 15mins      Distraction  2'  x    PA & Inf  L GH jt mobs  4' Grade III,  IV  x    Shoulder ABD with scap mob for depression & posterior tilt & inferior humeral glide  3'  x Sidelying    Sidelying shoulder flxion with scap mob for posterior tilt 3'  x    STM to L lat with shoulder flexion  3 min              EXERCISES        pulleys 2 min ea   x Flexion, scaption, abduction Open books  15x    Rotating L only    sidelying Flexion 15x      Sidelying ABD 15x      PROM arm circles: CW/CCW  15x      Supine serratus punches  2x15 0-1#   Guided 1st set to ensure understanding    Scapular depression with putty holes  20x      Supine B ER pull apart  2x10  Yellow      supine scap depression 10x      Semi Prone ITY 15x  x Towel under forehead   Tactile cues to reduce upper trap work    Semi - Prone extension 15x  x           Standing        W ball rolls  10x ea    Yellow ball on wall    TB straight arm pull downs 20x Red     TB rows  20x Red      TB horizontal Abd  20x  Red      Towel IR Stretch 5\"x10      Tband IR 10x Red     Other:    Specific Instructions for next treatment: continue with manual work, really focus on ROM (pulleys, wall walk ups, ROM). and to work on scapular mechanics       Assessment: [x] Progressing toward goals. Pt arrives 10 minutes late for session limiting program. Began with pulleys to increase ROM. Still limited with abduction and shoulder rotations which leads to compensations with trunk. Feel there are still signs that are consistent with adhesive capsulitis with joint hypomobilty and abnormal end feels with motion. To improve scapular control worked in semi-prone focusing on activation and working in pain free ranges. Continued manual to work on jt mobility. Tried to maximize range with PROM with assist to scapular motions to get greater mobility. Still lacking posterior scapular tilt and inferior glide to maximize motion. Pain also a limitor. Ended session with vaso for edema, pain and promoting healing. [] No change. [] Other:    [x] Patient would continue to benefit from skilled physical therapy services in order to:  increase L shoulder joint mobility, improve tightness in the postural muscles, optimize posture, increase scapular control, and improve gross strength of L shoulder to improve ability to reach and use the L arm with ADLs.     GOALS: Assesses 1/6/22  STG: (to be met in 6 treatments)  Pt will report no greater than 2/10 upon arrival to sessions demonstrating less symptoms with ADLs.    - 1/6: not met -- pain still with movement and arrival to sessions    Pt will demonstrate improved postural awareness with only minimal cues to correct posture throughout session to prevent undue stress to spine.  -1/6: progressing -- still compensating and torquing, needing cues to stability      Pt will improve L shoulder flexion and abductions to > 150 deg to demonstrate ability to reach overhead.    -1/6: progressing -- 130 on pulleys    Pt will increase L shoulder IR to at least L1 to be able to dress more efficiently. - 1/6: progressing -- improved to glute    Pt will increase L shoulder ER to at least 50 deg to be able to improve ability to reach laterally. -1/6: progressing -- similar to eval    Pt will consistent be able to set L scapula with exercises to demonstrate improved mechanics for using the LUE.    - 1/6: progressing -- still limited mechanics and control    Pt will decreased functional limitation per SPADI to < 67% to demonstrate a meaningful change in symptoms and less dysfunction with ADLs.    - 1/6: MET -- 61% this date    LTG: (to be met in 20 treatments) -- will assess at a later time   Pt will be able to reach overhead without an increase in pain to be able to work in her kitchen. Pt will report < 2 days per week incidence of burning sensation into the L hand to improve tolerance to use arm with ADLs. Pt will increase strength of all major muscle groups of the UEs to 5/5 or greater demonstrating improved strength needed to maximize safety and efficiency with ADLs involving lifting and carrying. Pt will be independent with home program addressing strength, flexibility and balance to maximize gains made in therapy to improve overall functional capacity for mobility.     Pt will decreased functional limitation per SPADI to < 57% to demonstrate a meaningful change in symptoms and less dysfunction with ADLs. Pt. Education:  [x] Yes  [] No  [x] Reviewed Prior HEP/Ed- continued education on avoiding increased pain with all exercises. Method of Education: [x] Verbal  [] Demo  [x] Written  Comprehension of Education:  [x] Verbalizes understanding. [x] Demonstrates understanding. [x] Needs review. [] Demonstrates/verbalizes HEP/Ed previously given. Plan: [x] Continue per plan of care.    [] Other: see below          Treatment Charges: Mins Units   []  Modalities     [x]  Ther Exercise 23 1   [x]  Manual Therapy 12 1   []  Ther Activities     []  Aquatics     []  Neuromuscular     [x] Vasocompression 10 1   [] Gait Training     [] Dry needling        [] 1 or 2 muscles        [] 3 or more muscles     []  Other     Total Treatment time 45 3     Time In:  4436      Time Out: 2066    Electronically signed by:  Lavern Bush, PT

## 2023-01-19 ENCOUNTER — HOSPITAL ENCOUNTER (OUTPATIENT)
Dept: PHYSICAL THERAPY | Age: 58
Setting detail: THERAPIES SERIES
Discharge: HOME | End: 2023-01-19
Payer: COMMERCIAL

## 2023-01-19 NOTE — CARE COORDINATION
[x] [facility]) - [facility]  [address]  [place]: [phone]   F: [phone]     Physical Therapy Cancel/No Show note    Date: 2023  Patient: Patric Hnery  : 1965  MRN: [id number]    Visit Count:   Cancels/No Shows to date: 3/0    For today's appointment patient:    [x]  Cancelled    [] Rescheduled appointment    [] No-show     Reason given by patient:    []  Patient ill    []  Conflicting appointment    [] No transportation      [] Conflict with work    [] No reason given    [] Weather related    [] COVID-19    [x] Other:      Comments:  Patient called to cancel as she had to pull over on her way to therapy due to the severe weather and decided to cancel.       [] Next appointment was confirmed    Electronically signed by: Brigido Ramos PTA

## 2023-01-23 ENCOUNTER — HOSPITAL ENCOUNTER (OUTPATIENT)
Dept: PHYSICAL THERAPY | Age: 58
Setting detail: THERAPIES SERIES
Discharge: HOME | End: 2023-01-23
Payer: COMMERCIAL

## 2023-01-23 PROCEDURE — 97140 MANUAL THERAPY 1/> REGIONS: CPT

## 2023-01-23 PROCEDURE — 97110 THERAPEUTIC EXERCISES: CPT

## 2023-01-23 NOTE — FLOWSHEET NOTE
509 [facility]   [address]   Phone: [phone]   Fax: [phone]    Physical Therapy Daily Treatment Note    Date:  2023  Patient Name:  Maria Guadalupe Bennett    :  1965  MRN: [id number]  Physician: Rosalinda Adame MD                                 Insurance: [facility] --  HARD MAX/auth per [employer] -- [facility]ba [facility] to [address] [place] [address] Diagnosis:   X37.899, M47.816 (ICD-10-CM) - Degenerative joint disease of cervical and lumbar spine   M75.82 (ICD-10-CM) - Rotator cuff tendinitis, left                             Rehab Codes: R25 pain , M25.60 stiffness, R53.1 weakness   Onset Date: 2 months ago                                  Next 's appt: 23- PMR   Visit# / total visits:  ( for )  Cancels/No Shows: 3/0  Date of initial visit: 22        Date of PN: 23 (visit 10)    Precautions: high anxiety    Subjective: Pt notes her shoulder is not as sharp of a pain to begin. Feels it is still a nagging pain. Feels that there is tightness affecting movements. She notes she bought a pulley system at [employer] but has yet to try it.      Pain:  [x] Yes  [] No Location: L bicep Pain Rating: (0-10 scale) 2-3/10 at rest and 6/10 with overhead reaching   Pain altered Tx:  [x] No  [] Yes  Action:  Comments:    Objective:  INTERVENTIONS  INTERVENTIONS  Reps/ Time Weight/ Level Completed  Today Comments          MODALITIES        Vasocompression -- L shoulder  10 min  34 deg             MANUAL -  L shoulder 8 min TOTAL      Sub scap release 2 min  x    Distraction  2'  x    PA & Inf  L GH jt mobs  4' Grade III,  IV  x    Shoulder ABD with scap mob for depression & posterior tilt & inferior humeral glide  3'   Sidelying    Sidelying shoulder flxion with scap mob for posterior tilt 3'      STM to L lat with shoulder flexion  3 min              EXERCISES        pulleys 2 min ea   x Flexion, scaption, abduction Open books  15x    Rotating L only    supine Flexion 10x 3# x Holding 5s at end range for stretch; thinking scap depression on lowering to reduce pain    Sidelying ABD 10x 3# x Holding 5s at end range for stretch; therapist assist scap for depression and posterior tilt    Sidelying ER  2x15 2# x    PROM arm circles: CW/CCW  15x      Supine serratus punches  2x15 0-1#   Guided 1st set to ensure understanding    Scapular depression with putty holes  20x      Supine B ER pull apart  2x10  Yellow       Prone ITY 15x  x Towel under forehead   Tactile cues to reduce upper trap work & promote more at scap retractors     Prone extension 15x  x           Standing        W ball rolls  15x ea   x Yellow ball on wall    TB straight arm pull downs 20x Red     TB rows  20x Red      TB horizontal Abd  20x  Red      Towel IR Stretch 5\"x10      Tband IR 10x Red     Other:    Specific Instructions for next treatment: continue with manual work, really focus on ROM (pulleys, wall walk ups, ROM). and to work on scapular mechanics       Assessment: [x] Progressing toward goals. Pt continues to have moderate pain with reaching quickly. Throughout cued for slower and controlled movements. Emphasized moving the scapula first for appropriate setting to reduce chances for impingement. Still very limited in range with tissue stretch and hard end feels leading to hypothesize adhesive capsulitis. Continued to utilize manual work and holds at end ranges to increase mobility. Pt does get very overwhelmed thinking about her pain so frequent redirection and focus on improvement given. [] No change. [] Other:    [x] Patient would continue to benefit from skilled physical therapy services in order to:  increase L shoulder joint mobility, improve tightness in the postural muscles, optimize posture, increase scapular control, and improve gross strength of L shoulder to improve ability to reach and use the L arm with ADLs.     GOALS: Assesses 1/6/22  STG: (to be met in 6 treatments)  Pt will report no greater than 2/10 upon arrival to sessions demonstrating less symptoms with ADLs.    - 1/6: not met -- pain still with movement and arrival to sessions    Pt will demonstrate improved postural awareness with only minimal cues to correct posture throughout session to prevent undue stress to spine.  -1/6: progressing -- still compensating and torquing, needing cues to stability      Pt will improve L shoulder flexion and abductions to > 150 deg to demonstrate ability to reach overhead.    -1/6: progressing -- 130 on pulleys    Pt will increase L shoulder IR to at least L1 to be able to dress more efficiently. - 1/6: progressing -- improved to glute    Pt will increase L shoulder ER to at least 50 deg to be able to improve ability to reach laterally. -1/6: progressing -- similar to eval    Pt will consistent be able to set L scapula with exercises to demonstrate improved mechanics for using the LUE.    - 1/6: progressing -- still limited mechanics and control    Pt will decreased functional limitation per SPADI to < 67% to demonstrate a meaningful change in symptoms and less dysfunction with ADLs.    - 1/6: MET -- 61% this date    LTG: (to be met in 20 treatments) -- will assess at a later time   Pt will be able to reach overhead without an increase in pain to be able to work in her kitchen. Pt will report < 2 days per week incidence of burning sensation into the L hand to improve tolerance to use arm with ADLs. Pt will increase strength of all major muscle groups of the UEs to 5/5 or greater demonstrating improved strength needed to maximize safety and efficiency with ADLs involving lifting and carrying. Pt will be independent with home program addressing strength, flexibility and balance to maximize gains made in therapy to improve overall functional capacity for mobility.     Pt will decreased functional limitation per SPADI to < 57% to demonstrate a meaningful change in symptoms and less dysfunction with ADLs. Pt. Education:  [x] Yes  [] No  [x] Reviewed Prior HEP/Ed- continued education on avoiding increased pain with all exercises. Method of Education: [x] Verbal  [] Demo  [x] Written  Comprehension of Education:  [x] Verbalizes understanding. [x] Demonstrates understanding. [x] Needs review. [] Demonstrates/verbalizes HEP/Ed previously given. Plan: [x] Continue per plan of care.    [] Other: see below          Treatment Charges: Mins Units   []  Modalities     [x]  Ther Exercise 31 2   [x]  Manual Therapy 10 1   []  Ther Activities     []  Aquatics     []  Neuromuscular     [] Vasocompression     [] Gait Training     [] Dry needling        [] 1 or 2 muscles        [] 3 or more muscles     []  Other     Total Treatment time 41 3     Time In:  1727 Time Out: 1527     Electronically signed by:  Jerry Tolliver PT

## 2023-01-26 ENCOUNTER — HOSPITAL ENCOUNTER (OUTPATIENT)
Dept: PHYSICAL THERAPY | Age: 58
Setting detail: THERAPIES SERIES
Discharge: HOME | End: 2023-01-26
Payer: COMMERCIAL

## 2023-01-26 PROCEDURE — 97140 MANUAL THERAPY 1/> REGIONS: CPT

## 2023-01-26 PROCEDURE — 97110 THERAPEUTIC EXERCISES: CPT

## 2023-01-26 NOTE — FLOWSHEET NOTE
509 [facility]   [address]   Phone: [phone]   Fax: [phone]    Physical Therapy Daily Treatment Note    Date:  2023  Patient Name:  Esther Oconnor    :  1965  MRN: [id number]  Physician: Keaton Wyatt MD                                 Insurance: [facility] --  HARD MAX/auth per [facility] -- Nigel Jatinder per [facility] deferred to [address] [place] [address] Diagnosis:   O24.920, M47.816 (ICD-10-CM) - Degenerative joint disease of cervical and lumbar spine   M75.82 (ICD-10-CM) - Rotator cuff tendinitis, left                             Rehab Codes: R25 pain , M25.60 stiffness, R53.1 weakness   Onset Date: 2 months ago                                  Next 's appt: 23- PMR   Visit# / total visits:  ( for )  Cancels/No Shows: 3/0      Precautions: high anxiety    Subjective: Patient reporting that she has been reaching out for stuff with less difficulty/pain.       Pain:  [x] Yes  [] No Location: L bicep Pain Rating: (0-10 scale) 3/10 at rest and 6/10 with overhead reaching   Pain altered Tx:  [x] No  [] Yes  Action:  Comments:    Objective:  INTERVENTIONS  INTERVENTIONS  Reps/ Time Weight/ Level Completed  Today Comments          MODALITIES        Vasocompression -- L shoulder  10 min  34 deg             MANUAL -  L shoulder 12 min TOTAL      Sub scap release 7 min  x    Distraction  3'  x    PA & Inf  L GH jt mobs  4' Grade III,  IV  x    Shoulder ABD with scap mob for depression & posterior tilt & inferior humeral glide  3'   Sidelying    Sidelying shoulder flxion with scap mob for posterior tilt 3'      STM to L lat with shoulder flexion  3 min              EXERCISES        pulleys 2 min ea   x Flexion, scaption, abduction-  pt tends to rotate trunk with shoulder ABD as pt is limited with horizontal ABD   Open books  15x    Rotating L only    supine Flexion 10x2 3# x Holding 5s at end range for stretch; thinking scap depression on lowering to reduce pain    Sidelying ABD 10x  5x 3# x Holding 5s at end range for stretch; therapist assist scap for depression and posterior tilt    Sidelying ER  2x15 2# x    PROM arm circles: CW/CCW  15x      Supine serratus punches  2x15 0-1#   Guided 1st set to ensure understanding    Scapular depression with putty holes  20x      Supine B ER pull apart  2x10  Yellow       Prone ITY 15x   Towel under forehead   Tactile cues to reduce upper trap work & promote more at scap retractors     Prone extension 15x             Standing        Bicep/chest stretch   ADD    W ball rolls  15x ea   x Yellow ball on wall    TB straight arm pull downs 20x Red     TB rows  20x Red      TB horizontal Abd  20x  Red      Towel IR Stretch 5\"x10      Tband IR 10x Red     Other:    Specific Instructions for next treatment: continue with manual work, really focus on ROM (pulleys, wall walk ups, ROM). and to work on scapular mechanics       Assessment: [] Progressing toward goals. [] No change. [x] Other:  1/26- Patient continues to be limited with ROM with pain and increased tightness noted in teres minor and teres major region. Therapist provided assistance at scapula to improve scapular mobility with shoulder ROM. With shoulder ABD, patient tends to rotate her trunk due to pain and tightness noted in anterior shoulder. Patient reporting and demonstrating fatigue at the end of session. Patient declined vaso and states she will ice at home. [x] Patient would continue to benefit from skilled physical therapy services in order to:  increase L shoulder joint mobility, improve tightness in the postural muscles, optimize posture, increase scapular control, and improve gross strength of L shoulder to improve ability to reach and use the L arm with ADLs.     GOALS: Assesses 1/6/22  STG: (to be met in 6 treatments)  Pt will report no greater than 2/10 upon arrival to sessions demonstrating less symptoms with ADLs.    - 1/6: not met -- pain still with movement and arrival to sessions    Pt will demonstrate improved postural awareness with only minimal cues to correct posture throughout session to prevent undue stress to spine.  -1/6: progressing -- still compensating and torquing, needing cues to stability      Pt will improve L shoulder flexion and abductions to > 150 deg to demonstrate ability to reach overhead.    -1/6: progressing -- 130 on pulleys    Pt will increase L shoulder IR to at least L1 to be able to dress more efficiently. - 1/6: progressing -- improved to glute    Pt will increase L shoulder ER to at least 50 deg to be able to improve ability to reach laterally. -1/6: progressing -- similar to eval    Pt will consistent be able to set L scapula with exercises to demonstrate improved mechanics for using the LUE.    - 1/6: progressing -- still limited mechanics and control    Pt will decreased functional limitation per SPADI to < 67% to demonstrate a meaningful change in symptoms and less dysfunction with ADLs.    - 1/6: MET -- 61% this date    LTG: (to be met in 20 treatments) -- will assess at a later time   Pt will be able to reach overhead without an increase in pain to be able to work in her kitchen. Pt will report < 2 days per week incidence of burning sensation into the L hand to improve tolerance to use arm with ADLs. Pt will increase strength of all major muscle groups of the UEs to 5/5 or greater demonstrating improved strength needed to maximize safety and efficiency with ADLs involving lifting and carrying. Pt will be independent with home program addressing strength, flexibility and balance to maximize gains made in therapy to improve overall functional capacity for mobility. Pt will decreased functional limitation per SPADI to < 57% to demonstrate a meaningful change in symptoms and less dysfunction with ADLs.       Pt. Education:  [x] Yes  [] No  [x] Reviewed Prior HEP/Ed- continued education on avoiding increased pain with all exercises. Method of Education: [x] Verbal  [] Demo  [x] Written  Comprehension of Education:  [x] Verbalizes understanding. [x] Demonstrates understanding. [x] Needs review. [] Demonstrates/verbalizes HEP/Ed previously given. Plan: [x] Continue per plan of care.    [] Other: see below          Treatment Charges: Mins Units   []  Modalities     [x]  Ther Exercise 35 2   [x]  Manual Therapy 12 1   []  Ther Activities     []  Aquatics     []  Neuromuscular     [] Vasocompression     [] Gait Training     [] Dry needling        [] 1 or 2 muscles        [] 3 or more muscles     []  Other     Total Treatment time 47 3     Time In:  3450 Time Out: 6616    Electronically signed by:  Darvin Garzon PTA

## 2023-04-20 ENCOUNTER — TELEPHONE (OUTPATIENT)
Dept: FAMILY MEDICINE CLINIC | Age: 58
End: 2023-04-20

## 2023-04-20 DIAGNOSIS — R92.8 ABNORMAL MAMMOGRAM OF BOTH BREASTS: Primary | ICD-10-CM

## 2023-04-24 DIAGNOSIS — R92.8 ABNORMAL MAMMOGRAM OF BOTH BREASTS: Primary | ICD-10-CM

## 2023-05-25 ENCOUNTER — HOSPITAL ENCOUNTER (OUTPATIENT)
Dept: MRI IMAGING | Age: 58
Discharge: HOME | End: 2023-05-27
Payer: COMMERCIAL

## 2023-05-25 DIAGNOSIS — R92.8 ABNORMAL MAMMOGRAM OF BOTH BREASTS: ICD-10-CM

## 2023-05-25 PROCEDURE — 6360000004 HC RX CONTRAST MEDICATION: Performed by: FAMILY MEDICINE

## 2023-05-25 PROCEDURE — A9579 GAD-BASE MR CONTRAST NOS,1ML: HCPCS | Performed by: FAMILY MEDICINE

## 2023-05-25 PROCEDURE — C8908 MRI W/O FOL W/CONT, BREAST,: HCPCS

## 2023-05-25 PROCEDURE — 2580000003 HC RX 258: Performed by: FAMILY MEDICINE

## 2023-05-25 RX ORDER — 0.9 % SODIUM CHLORIDE 0.9 %
50 INTRAVENOUS SOLUTION INTRAVENOUS ONCE
Status: COMPLETED | OUTPATIENT
Start: 2023-05-25 | End: 2023-05-25

## 2023-05-25 RX ORDER — SODIUM CHLORIDE 0.9 % (FLUSH) 0.9 %
10 SYRINGE (ML) INJECTION ONCE
Status: COMPLETED | OUTPATIENT
Start: 2023-05-25 | End: 2023-05-25

## 2023-05-25 RX ADMIN — SODIUM CHLORIDE 50 ML: 9 INJECTION, SOLUTION INTRAVENOUS at 10:50

## 2023-05-25 RX ADMIN — GADOTERIDOL 17 ML: 279.3 INJECTION, SOLUTION INTRAVENOUS at 10:50

## 2023-05-25 RX ADMIN — SODIUM CHLORIDE, PRESERVATIVE FREE 10 ML: 5 INJECTION INTRAVENOUS at 10:51

## 2023-09-26 ENCOUNTER — TELEMEDICINE (OUTPATIENT)
Dept: PHYSICAL MEDICINE AND REHAB | Age: 58
End: 2023-09-26

## 2023-09-26 DIAGNOSIS — G89.4 CHRONIC PAIN SYNDROME: Primary | ICD-10-CM

## 2023-09-26 NOTE — PROGRESS NOTES
2023    TELEHEALTH EVALUATION -- Audio/Visual    HPI:    Nuria Will (:  1965) has requested an audio/video evaluation for the following concern(s):    Patient with history of chronic pain in multiple areas who is being seen in video follow up at her request today. She saw a chiropractor in May Dr. Milana Juarez who performed adjustment and treatment. She feels some benefit of her pain from that treatment. She also notes some improvement with her pain after acupuncture treatment. She feels that her pain is stable and she is making some adjustments to her activity/addressing her anxiety which she feels is helping her overall health and pain. Review of Systems    Prior to Visit Medications    Medication Sig Taking? Authorizing Provider   famotidine (PEPCID) 20 MG tablet take 1 tablet by mouth twice a day Yes Santino Bazzi MD   meloxicam (MOBIC) 7.5 MG tablet Take 1 tablet by mouth daily Yes Teresa Palacios MD   gabapentin (NEURONTIN) 100 MG capsule take 1 capsule by mouth twice a day  Santino Bazzi MD   Misc. Devices MISC 1 each by Does not apply route once as needed (self guided deep tissue massage) Please dispense one theracane device. Massage back as tolerated to relieve muscle spasms. Teresa Palacios MD       Social History     Tobacco Use    Smoking status: Never    Smokeless tobacco: Never   Vaping Use    Vaping Use: Never used   Substance Use Topics    Alcohol use:  Yes     Alcohol/week: 1.0 standard drink of alcohol     Types: 1 Standard drinks or equivalent per week     Comment: BEER, WINE OR MIXED DRINKS 4 DRINKS A MONTH    Drug use: Yes     Types: Marijuana Shona Sella)     Comment: occasional            PHYSICAL EXAMINATION:  [ INSTRUCTIONS:  \"[x]\" Indicates a positive item  \"[]\" Indicates a negative item  -- DELETE ALL ITEMS NOT EXAMINED]  Vital Signs: (As obtained by patient/caregiver or practitioner observation)    Blood pressure-  Heart rate-    Respiratory rate-    Temperature-

## (undated) DEVICE — 3 ML SYRINGE LUER-LOCK TIP: Brand: MONOJECT

## (undated) DEVICE — APPLICATOR FIBER TIP 16 IN CELLULOSE HD SWAB CHEVRON SCPET

## (undated) DEVICE — GLOVE SURG SZ 7 L12IN THK7.5MIL DK GRN LTX FREE MSG6570] MEDLINE INDUSTRIES INC]

## (undated) DEVICE — STRAP,POSITIONING,KNEE/BODY,FOAM,4X60": Brand: MEDLINE

## (undated) DEVICE — GOWN,AURORA,NONRNF,XL,30/CS: Brand: MEDLINE

## (undated) DEVICE — Z DISCONTINUED BY MEDLINE USE 2711682 TRAY SKIN PREP DRY W/ PREM GLV

## (undated) DEVICE — Z INACTIVE USE 2527070 DRAPE SURG W40XL44IN UNDERBUTTOCK SMS POLYPR W/ PCH BK DISP

## (undated) DEVICE — SYRINGE 20ML LL S/C 50

## (undated) DEVICE — GOWN,AURORA,NONREINFORCED,LARGE: Brand: MEDLINE

## (undated) DEVICE — NEEDLE SYR 18GA L1.5IN RED PLAS HUB S STL BLNT FILL W/O

## (undated) DEVICE — NEEDLE SPNL 22GA L5IN BLK HUB S STL W/ QNCKE PNT W/OUT

## (undated) DEVICE — CATHETER URETH 16FR BLLN 5CC SIL ALLY W/ SIL HYDRGEL 2 W F

## (undated) DEVICE — SYRINGE, LUER LOCK, 10ML: Brand: MEDLINE

## (undated) DEVICE — SYRINGE MED 3ML CLR PLAS STD N CTRL LUERLOCK TIP DISP

## (undated) DEVICE — PREP SOL PVP IODINE 4%  4 OZ/BTL

## (undated) DEVICE — MHPB CYSTO PACK-LF: Brand: MEDLINE INDUSTRIES, INC.

## (undated) DEVICE — HYPODERMIC SAFETY NEEDLE: Brand: MAGELLAN

## (undated) DEVICE — SOLUTION SURG PREP POV IOD 7.5% 4 OZ

## (undated) DEVICE — NEEDLE SPNL 22GA L7IN SPINOCAN

## (undated) DEVICE — CATHETER,URETHRAL,REDRUBBER,STRL,12FR: Brand: MEDLINE INDUSTRIES, INC.

## (undated) DEVICE — GLOVE ORANGE PI 7 1/2   MSG9075

## (undated) DEVICE — SYRINGE MED 10ML LUERLOCK TIP W/O SFTY DISP

## (undated) DEVICE — SPONGE GZ W4XL4IN 16 PLY DATA MSTR TAGGED DBL RADPQ